# Patient Record
Sex: MALE | Race: WHITE | Employment: OTHER | URBAN - METROPOLITAN AREA
[De-identification: names, ages, dates, MRNs, and addresses within clinical notes are randomized per-mention and may not be internally consistent; named-entity substitution may affect disease eponyms.]

---

## 2020-02-07 ENCOUNTER — HOSPITAL ENCOUNTER (EMERGENCY)
Age: 75
Discharge: HOME | End: 2020-02-07
Payer: COMMERCIAL

## 2020-02-07 VITALS
SYSTOLIC BLOOD PRESSURE: 128 MMHG | TEMPERATURE: 98.4 F | HEIGHT: 67 IN | OXYGEN SATURATION: 99 % | BODY MASS INDEX: 27.09 KG/M2 | HEART RATE: 64 BPM | WEIGHT: 172.62 LBS | DIASTOLIC BLOOD PRESSURE: 82 MMHG | RESPIRATION RATE: 14 BRPM

## 2020-02-07 LAB
RAPID INFLUENZA  B AGN: NEGATIVE
RAPID INFLUENZA A AGN: NEGATIVE

## 2020-02-07 PROCEDURE — 99283 EMERGENCY DEPT VISIT LOW MDM: CPT

## 2020-02-07 PROCEDURE — 87804 INFLUENZA ASSAY W/OPTIC: CPT

## 2020-02-07 RX ORDER — LORATADINE 10 MG/1
10 TABLET ORAL DAILY
Qty: 30 TABLET | Refills: 0 | Status: ON HOLD | OUTPATIENT
Start: 2020-02-07 | End: 2022-08-04

## 2020-02-07 RX ORDER — GUAIFENESIN, PSEUDOEPHEDRINE HYDROCHLORIDE 600; 60 MG/1; MG/1
1 TABLET, EXTENDED RELEASE ORAL EVERY 12 HOURS
Qty: 14 TABLET | Refills: 0 | Status: SHIPPED | OUTPATIENT
Start: 2020-02-07 | End: 2020-02-07 | Stop reason: ALTCHOICE

## 2020-02-07 RX ORDER — ACETAMINOPHEN 325 MG/1
650 TABLET ORAL EVERY 6 HOURS PRN
Qty: 60 TABLET | Refills: 0 | Status: SHIPPED | OUTPATIENT
Start: 2020-02-07

## 2020-02-07 RX ORDER — ALBUTEROL SULFATE 90 UG/1
AEROSOL, METERED RESPIRATORY (INHALATION)
Qty: 1 INHALER | Refills: 0 | Status: SHIPPED | OUTPATIENT
Start: 2020-02-07

## 2020-02-07 RX ORDER — BENZONATATE 100 MG/1
100 CAPSULE ORAL 3 TIMES DAILY PRN
Qty: 20 CAPSULE | Refills: 0 | Status: ON HOLD | OUTPATIENT
Start: 2020-02-07 | End: 2022-08-04

## 2020-02-07 SDOH — HEALTH STABILITY: MENTAL HEALTH: HOW OFTEN DO YOU HAVE A DRINK CONTAINING ALCOHOL?: NEVER

## 2020-02-07 ASSESSMENT — PAIN SCALES - GENERAL: PAINLEVEL_OUTOF10: 6

## 2020-02-07 ASSESSMENT — PAIN DESCRIPTION - ORIENTATION: ORIENTATION: RIGHT;LEFT

## 2020-02-07 ASSESSMENT — PAIN DESCRIPTION - FREQUENCY: FREQUENCY: CONTINUOUS

## 2020-02-07 ASSESSMENT — PAIN DESCRIPTION - PROGRESSION: CLINICAL_PROGRESSION: GRADUALLY WORSENING

## 2020-02-07 ASSESSMENT — PAIN DESCRIPTION - ONSET: ONSET: GRADUAL

## 2020-02-07 ASSESSMENT — PAIN DESCRIPTION - LOCATION: LOCATION: EYE

## 2020-02-07 ASSESSMENT — PAIN DESCRIPTION - DESCRIPTORS: DESCRIPTORS: BURNING

## 2020-02-07 ASSESSMENT — PAIN DESCRIPTION - PAIN TYPE: TYPE: ACUTE PAIN

## 2020-02-08 NOTE — ED PROVIDER NOTES
Smokeless tobacco: Never Used   Substance and Sexual Activity    Alcohol use: Never     Frequency: Never    Drug use: Never    Sexual activity: None   Lifestyle    Physical activity:     Days per week: None     Minutes per session: None    Stress: None   Relationships    Social connections:     Talks on phone: None     Gets together: None     Attends Anglican service: None     Active member of club or organization: None     Attends meetings of clubs or organizations: None     Relationship status: None    Intimate partner violence:     Fear of current or ex partner: None     Emotionally abused: None     Physically abused: None     Forced sexual activity: None   Other Topics Concern    None   Social History Narrative    None       PHYSICAL EXAM    VITAL SIGNS: /82   Pulse 64   Temp 98.4 °F (36.9 °C) (Oral)   Resp 14   Ht 5' 7\" (1.702 m)   Wt 172 lb 9.9 oz (78.3 kg)   SpO2 99%   BMI 27.04 kg/m²   Constitutional:  Well developed, well nourished, no acute distress  Eyes: Sclera nonicteric, conjunctiva normal   Ears: The right ear canal appears clear and the ipsilateral TM appears clear, the ipsilateral mastoid and pinna are without redness or swelling. The left ear canal appears clear and the ipsilateral TM appears clear, the ipsilateral mastoid and pinna are without redness or swelling    Throat/Face:  no exudate or edema of the throat, no trismus  Neck: Supple, no enlarged tonsillar lymphadenopathy  Respiratory:  Lungs clear to auscultation bilaterally, no retractions  Cardiovascular:  regular rate, regular rhythm  GI: soft, nontender, nondistended  Neurologic: Awake, alert and oriented, no slurred speech  Integument: Skin is warm and dry, no rash on exposed skin. Healing sore on right external nare, no surrounding cellulitis or evidence of infection. RADIOLOGY/PROCEDURES    No orders to display       ED [facility]    See chart for details of medications given.     Vitals:

## 2022-08-03 ENCOUNTER — APPOINTMENT (OUTPATIENT)
Dept: CT IMAGING | Age: 77
DRG: 871 | End: 2022-08-03
Payer: COMMERCIAL

## 2022-08-03 ENCOUNTER — APPOINTMENT (OUTPATIENT)
Dept: GENERAL RADIOLOGY | Age: 77
DRG: 871 | End: 2022-08-03
Payer: COMMERCIAL

## 2022-08-03 ENCOUNTER — HOSPITAL ENCOUNTER (INPATIENT)
Age: 77
LOS: 7 days | Discharge: NURSING, REHAB OR LONG-TERM CARE | DRG: 871 | End: 2022-08-10
Attending: EMERGENCY MEDICINE | Admitting: INTERNAL MEDICINE
Payer: COMMERCIAL

## 2022-08-03 DIAGNOSIS — R09.02 HYPOXIA: ICD-10-CM

## 2022-08-03 DIAGNOSIS — R65.20 SEVERE SEPSIS (HCC): ICD-10-CM

## 2022-08-03 DIAGNOSIS — N28.9 RENAL INSUFFICIENCY: ICD-10-CM

## 2022-08-03 DIAGNOSIS — J18.9 PNEUMONIA DUE TO INFECTIOUS ORGANISM, UNSPECIFIED LATERALITY, UNSPECIFIED PART OF LUNG: Primary | ICD-10-CM

## 2022-08-03 DIAGNOSIS — A41.9 SEVERE SEPSIS (HCC): ICD-10-CM

## 2022-08-03 LAB
A/G RATIO: 0.9 (ref 1.1–2.2)
ABO/RH: NORMAL
ALBUMIN SERPL-MCNC: 3.5 G/DL (ref 3.4–5)
ALP BLD-CCNC: 76 U/L (ref 40–129)
ALT SERPL-CCNC: 41 U/L (ref 10–40)
AMMONIA: 24 UMOL/L (ref 16–60)
AMORPHOUS: ABNORMAL /HPF
AMPHETAMINE SCREEN, URINE: NORMAL
ANION GAP SERPL CALCULATED.3IONS-SCNC: 17 MMOL/L (ref 3–16)
ANTIBODY SCREEN: NORMAL
APTT: 49 SEC (ref 23–34.3)
AST SERPL-CCNC: 243 U/L (ref 15–37)
BACTERIA: ABNORMAL /HPF
BARBITURATE SCREEN URINE: NORMAL
BASE EXCESS VENOUS: -0.2 MMOL/L
BASE EXCESS VENOUS: 0.7 MMOL/L (ref -3–3)
BASOPHILS ABSOLUTE: 0.1 K/UL (ref 0–0.2)
BASOPHILS RELATIVE PERCENT: 0.8 %
BENZODIAZEPINE SCREEN, URINE: NORMAL
BILIRUB SERPL-MCNC: 0.6 MG/DL (ref 0–1)
BILIRUBIN URINE: NEGATIVE
BLOOD, URINE: ABNORMAL
BUN BLDV-MCNC: 28 MG/DL (ref 7–20)
CALCIUM SERPL-MCNC: 8.3 MG/DL (ref 8.3–10.6)
CANNABINOID SCREEN URINE: NORMAL
CARBOXYHEMOGLOBIN: 1.1 %
CHLORIDE BLD-SCNC: 97 MMOL/L (ref 99–110)
CLARITY: CLEAR
CO2: 20 MMOL/L (ref 21–32)
COARSE CASTS, UA: ABNORMAL /LPF (ref 0–2)
COCAINE METABOLITE SCREEN URINE: NORMAL
COLOR: ABNORMAL
COMMENT UA: ABNORMAL
CREAT SERPL-MCNC: 1.4 MG/DL (ref 0.8–1.3)
EOSINOPHILS ABSOLUTE: 0 K/UL (ref 0–0.6)
EOSINOPHILS RELATIVE PERCENT: 0 %
EPITHELIAL CELLS, UA: ABNORMAL /HPF (ref 0–5)
GFR AFRICAN AMERICAN: 60
GFR NON-AFRICAN AMERICAN: 49
GLUCOSE BLD-MCNC: 104 MG/DL (ref 70–99)
GLUCOSE BLD-MCNC: 105 MG/DL (ref 70–99)
GLUCOSE URINE: NEGATIVE MG/DL
HCO3 VENOUS: 24.3 MMOL/L (ref 23–29)
HCO3 VENOUS: 25 MMOL/L (ref 23–29)
HCT VFR BLD CALC: 31.8 % (ref 40.5–52.5)
HCT VFR BLD CALC: 35 % (ref 40.5–52.5)
HEMOGLOBIN: 10.1 G/DL (ref 13.5–17.5)
HEMOGLOBIN: 11.3 G/DL (ref 13.5–17.5)
HYALINE CASTS: ABNORMAL /LPF (ref 0–2)
INR BLD: 1.35 (ref 0.87–1.14)
KETONES, URINE: NEGATIVE MG/DL
LACTIC ACID: 1.4 MMOL/L (ref 0.4–2)
LACTIC ACID: 1.9 MMOL/L (ref 0.4–2)
LACTIC ACID: 2.3 MMOL/L (ref 0.4–2)
LEUKOCYTE ESTERASE, URINE: NEGATIVE
LYMPHOCYTES ABSOLUTE: 1.1 K/UL (ref 1–5.1)
LYMPHOCYTES RELATIVE PERCENT: 10.1 %
Lab: NORMAL
MCH RBC QN AUTO: 23.3 PG (ref 26–34)
MCHC RBC AUTO-ENTMCNC: 32.3 G/DL (ref 31–36)
MCV RBC AUTO: 72.2 FL (ref 80–100)
METHADONE SCREEN, URINE: NORMAL
METHEMOGLOBIN VENOUS: 0.3 %
MICROSCOPIC EXAMINATION: YES
MONOCYTES ABSOLUTE: 1 K/UL (ref 0–1.3)
MONOCYTES RELATIVE PERCENT: 9 %
MUCUS: ABNORMAL /LPF
NEUTROPHILS ABSOLUTE: 8.5 K/UL (ref 1.7–7.7)
NEUTROPHILS RELATIVE PERCENT: 80.1 %
NITRITE, URINE: NEGATIVE
O2 CONTENT, VEN: 34 VOL %
O2 SAT, VEN: 36 %
O2 SAT, VEN: 71 %
O2 THERAPY: ABNORMAL
O2 THERAPY: NORMAL
OPIATE SCREEN URINE: NORMAL
OXYCODONE URINE: NORMAL
PCO2, VEN: 33 MMHG (ref 40–50)
PCO2, VEN: 41.7 MMHG (ref 40–50)
PDW BLD-RTO: 20.5 % (ref 12.4–15.4)
PERFORMED ON: ABNORMAL
PH UA: 6
PH UA: 6 (ref 5–8)
PH VENOUS: 7.38 (ref 7.35–7.45)
PH VENOUS: 7.47 (ref 7.35–7.45)
PHENCYCLIDINE SCREEN URINE: NORMAL
PLATELET # BLD: 203 K/UL (ref 135–450)
PMV BLD AUTO: 8.2 FL (ref 5–10.5)
PO2, VEN: 34.3 MMHG (ref 25–40)
PO2, VEN: <30 MMHG
POTASSIUM SERPL-SCNC: 4.8 MMOL/L (ref 3.5–5.1)
PRO-BNP: 594 PG/ML (ref 0–449)
PROPOXYPHENE SCREEN: NORMAL
PROTEIN UA: 100 MG/DL
PROTHROMBIN TIME: 16.6 SEC (ref 11.7–14.5)
RBC # BLD: 4.86 M/UL (ref 4.2–5.9)
RBC UA: ABNORMAL /HPF (ref 0–4)
SARS-COV-2, NAAT: NOT DETECTED
SODIUM BLD-SCNC: 134 MMOL/L (ref 136–145)
SPECIFIC GRAVITY UA: 1.02 (ref 1–1.03)
TCO2 CALC VENOUS: 25 MMOL/L
TCO2 CALC VENOUS: 26 MMOL/L
TOTAL CK: 5440 U/L (ref 39–308)
TOTAL PROTEIN: 7.5 G/DL (ref 6.4–8.2)
TROPONIN: 0.05 NG/ML
URINE REFLEX TO CULTURE: ABNORMAL
URINE TYPE: ABNORMAL
UROBILINOGEN, URINE: 1 E.U./DL
WBC # BLD: 10.6 K/UL (ref 4–11)
WBC UA: ABNORMAL /HPF (ref 0–5)

## 2022-08-03 PROCEDURE — 85610 PROTHROMBIN TIME: CPT

## 2022-08-03 PROCEDURE — 6370000000 HC RX 637 (ALT 250 FOR IP): Performed by: EMERGENCY MEDICINE

## 2022-08-03 PROCEDURE — 36415 COLL VENOUS BLD VENIPUNCTURE: CPT

## 2022-08-03 PROCEDURE — 85730 THROMBOPLASTIN TIME PARTIAL: CPT

## 2022-08-03 PROCEDURE — 71250 CT THORAX DX C-: CPT

## 2022-08-03 PROCEDURE — 6360000002 HC RX W HCPCS: Performed by: EMERGENCY MEDICINE

## 2022-08-03 PROCEDURE — 86850 RBC ANTIBODY SCREEN: CPT

## 2022-08-03 PROCEDURE — 80053 COMPREHEN METABOLIC PANEL: CPT

## 2022-08-03 PROCEDURE — 96365 THER/PROPH/DIAG IV INF INIT: CPT

## 2022-08-03 PROCEDURE — 83605 ASSAY OF LACTIC ACID: CPT

## 2022-08-03 PROCEDURE — 70450 CT HEAD/BRAIN W/O DYE: CPT

## 2022-08-03 PROCEDURE — 96366 THER/PROPH/DIAG IV INF ADDON: CPT

## 2022-08-03 PROCEDURE — 80307 DRUG TEST PRSMV CHEM ANLYZR: CPT

## 2022-08-03 PROCEDURE — 82550 ASSAY OF CK (CPK): CPT

## 2022-08-03 PROCEDURE — 87635 SARS-COV-2 COVID-19 AMP PRB: CPT

## 2022-08-03 PROCEDURE — 83880 ASSAY OF NATRIURETIC PEPTIDE: CPT

## 2022-08-03 PROCEDURE — 99285 EMERGENCY DEPT VISIT HI MDM: CPT

## 2022-08-03 PROCEDURE — 2580000003 HC RX 258: Performed by: EMERGENCY MEDICINE

## 2022-08-03 PROCEDURE — 85014 HEMATOCRIT: CPT

## 2022-08-03 PROCEDURE — 86901 BLOOD TYPING SEROLOGIC RH(D): CPT

## 2022-08-03 PROCEDURE — 84484 ASSAY OF TROPONIN QUANT: CPT

## 2022-08-03 PROCEDURE — 82803 BLOOD GASES ANY COMBINATION: CPT

## 2022-08-03 PROCEDURE — 96367 TX/PROPH/DG ADDL SEQ IV INF: CPT

## 2022-08-03 PROCEDURE — 73502 X-RAY EXAM HIP UNI 2-3 VIEWS: CPT

## 2022-08-03 PROCEDURE — 86900 BLOOD TYPING SEROLOGIC ABO: CPT

## 2022-08-03 PROCEDURE — 85018 HEMOGLOBIN: CPT

## 2022-08-03 PROCEDURE — 71045 X-RAY EXAM CHEST 1 VIEW: CPT

## 2022-08-03 PROCEDURE — 82140 ASSAY OF AMMONIA: CPT

## 2022-08-03 PROCEDURE — 85025 COMPLETE CBC W/AUTO DIFF WBC: CPT

## 2022-08-03 PROCEDURE — 94640 AIRWAY INHALATION TREATMENT: CPT

## 2022-08-03 PROCEDURE — 87040 BLOOD CULTURE FOR BACTERIA: CPT

## 2022-08-03 PROCEDURE — 1200000000 HC SEMI PRIVATE

## 2022-08-03 PROCEDURE — 81001 URINALYSIS AUTO W/SCOPE: CPT

## 2022-08-03 RX ORDER — FUROSEMIDE 20 MG/1
20 TABLET ORAL DAILY
Status: ON HOLD | COMMUNITY
Start: 2022-06-10 | End: 2022-08-09 | Stop reason: HOSPADM

## 2022-08-03 RX ORDER — SODIUM CHLORIDE 0.9 % (FLUSH) 0.9 %
5-40 SYRINGE (ML) INJECTION PRN
Status: DISCONTINUED | OUTPATIENT
Start: 2022-08-03 | End: 2022-08-04 | Stop reason: SDUPTHER

## 2022-08-03 RX ORDER — SODIUM CHLORIDE 9 MG/ML
INJECTION, SOLUTION INTRAVENOUS PRN
Status: DISCONTINUED | OUTPATIENT
Start: 2022-08-03 | End: 2022-08-04 | Stop reason: SDUPTHER

## 2022-08-03 RX ORDER — ACETAMINOPHEN 325 MG/1
650 TABLET ORAL ONCE
Status: COMPLETED | OUTPATIENT
Start: 2022-08-03 | End: 2022-08-03

## 2022-08-03 RX ORDER — LEVOFLOXACIN 5 MG/ML
750 INJECTION, SOLUTION INTRAVENOUS ONCE
Status: COMPLETED | OUTPATIENT
Start: 2022-08-03 | End: 2022-08-03

## 2022-08-03 RX ORDER — 0.9 % SODIUM CHLORIDE 0.9 %
1000 INTRAVENOUS SOLUTION INTRAVENOUS ONCE
Status: COMPLETED | OUTPATIENT
Start: 2022-08-03 | End: 2022-08-03

## 2022-08-03 RX ORDER — IPRATROPIUM BROMIDE AND ALBUTEROL SULFATE 2.5; .5 MG/3ML; MG/3ML
1 SOLUTION RESPIRATORY (INHALATION)
Status: DISCONTINUED | OUTPATIENT
Start: 2022-08-03 | End: 2022-08-04

## 2022-08-03 RX ORDER — 0.9 % SODIUM CHLORIDE 0.9 %
30 INTRAVENOUS SOLUTION INTRAVENOUS ONCE
Status: COMPLETED | OUTPATIENT
Start: 2022-08-03 | End: 2022-08-03

## 2022-08-03 RX ORDER — SODIUM CHLORIDE 0.9 % (FLUSH) 0.9 %
5-40 SYRINGE (ML) INJECTION EVERY 12 HOURS SCHEDULED
Status: DISCONTINUED | OUTPATIENT
Start: 2022-08-03 | End: 2022-08-04 | Stop reason: SDUPTHER

## 2022-08-03 RX ADMIN — SODIUM CHLORIDE 1000 ML: 9 INJECTION, SOLUTION INTRAVENOUS at 13:48

## 2022-08-03 RX ADMIN — PIPERACILLIN AND TAZOBACTAM 4500 MG: 4; .5 INJECTION, POWDER, LYOPHILIZED, FOR SOLUTION INTRAVENOUS at 15:12

## 2022-08-03 RX ADMIN — IPRATROPIUM BROMIDE AND ALBUTEROL SULFATE 1 AMPULE: .5; 3 SOLUTION RESPIRATORY (INHALATION) at 10:35

## 2022-08-03 RX ADMIN — ACETAMINOPHEN 650 MG: 325 TABLET ORAL at 14:45

## 2022-08-03 RX ADMIN — SODIUM CHLORIDE 1061 ML: 9 INJECTION, SOLUTION INTRAVENOUS at 15:13

## 2022-08-03 RX ADMIN — IPRATROPIUM BROMIDE AND ALBUTEROL SULFATE 1 AMPULE: .5; 3 SOLUTION RESPIRATORY (INHALATION) at 20:33

## 2022-08-03 RX ADMIN — LEVOFLOXACIN 750 MG: 5 INJECTION, SOLUTION INTRAVENOUS at 16:38

## 2022-08-03 RX ADMIN — SODIUM CHLORIDE, PRESERVATIVE FREE 10 ML: 5 INJECTION INTRAVENOUS at 23:56

## 2022-08-03 ASSESSMENT — LIFESTYLE VARIABLES
HOW MANY STANDARD DRINKS CONTAINING ALCOHOL DO YOU HAVE ON A TYPICAL DAY: 1 OR 2
HOW OFTEN DO YOU HAVE A DRINK CONTAINING ALCOHOL: 2-4 TIMES A MONTH

## 2022-08-03 ASSESSMENT — PAIN SCALES - GENERAL: PAINLEVEL_OUTOF10: 0

## 2022-08-03 ASSESSMENT — ENCOUNTER SYMPTOMS
SORE THROAT: 0
EYE REDNESS: 0
ABDOMINAL PAIN: 0
SHORTNESS OF BREATH: 0
RHINORRHEA: 0

## 2022-08-03 ASSESSMENT — PAIN DESCRIPTION - FREQUENCY: FREQUENCY: CONTINUOUS

## 2022-08-03 ASSESSMENT — PAIN - FUNCTIONAL ASSESSMENT
PAIN_FUNCTIONAL_ASSESSMENT: NONE - DENIES PAIN
PAIN_FUNCTIONAL_ASSESSMENT: 0-10

## 2022-08-03 NOTE — PROGRESS NOTES
Patient arrived to PCU via stretcher w/ EMS transport. He is oriented to self and month, but disoriented to place and situation. He cannot tell me where he resides. He is lethargic with impaired hearing, but does easily arouse to voice. Per EMS staff, patient's spouse will be obtaining transport to [facility] this date.

## 2022-08-03 NOTE — ED NOTES
Report given to isiah ZUNIGA ,  Reports given to EMS,  Belongings sent with pts  Pts wife Is going to catch a cab over to [facility], [facility]  08/03/22 8878

## 2022-08-03 NOTE — ED NOTES
Pt given water and place on 3 L NC ,while asleep pt sat is 86-88%     Keaton Buenrostro RN  08/03/22 6835

## 2022-08-03 NOTE — ED NOTES
Pt on his third cup of water , pt wife at bedside, said he started to feel bad a few days ago, states that he started to lose his hearing last week. She states that they are sleeping outside, she said she thinks this might be from bad coffee, ot he smoked something, RN explained multiple times pt has a cough he stated that he is fatigued , pt said oh he is probable just stressed .       Marie Reyes, RN  08/03/22 Dk Mckeon, NADIA  08/03/22 0568

## 2022-08-03 NOTE — ED PROVIDER NOTES
[address] [facility]  eMERGENCY dEPARTMENT eNCOUnter      Pt Name: Mavis Mcpherson  MRN: [id number]  [place] 1945  Date of evaluation: 8/3/2022  Provider: Cheyenne Xavier MD    [address] [place] [address]       Chief Complaint   Patient presents with    Shortness of Breath     Pt was picked up outside of the [place] [address] , EMS reports pts confused not really answering, pt looks bad ,          HISTORY OF PRESENT ILLNESS   (Location/Symptom, Timing/Onset,Context/Setting, Quality, Duration, Modifying Factors, Severity)  Note limiting factors. Mavis Mcpherson is a 77 y.o. male who presents to the emergency department via EMS. EMS reports that they picked him up because he was not feeling well. No interventions were performed prior to arrival in our ED. Patient states he is very hard of hearing in his right ear. He states that he is just tired and was unable to stand up today and someone called the ambulance. He denies fever, chills, shortness of breath, chest pain, belly pain, urinary symptoms, any other symptoms other than just fatigue. He reports being homeless. HPI    NursingNotes were reviewed. REVIEW OF SYSTEMS    (2-9 systems for level 4, 10 or more for level 5)     Review of Systems   Constitutional:  Positive for fatigue. Negative for fever. HENT:  Negative for rhinorrhea and sore throat. Eyes:  Negative for redness. Respiratory:  Negative for shortness of breath. Cardiovascular:  Negative for chest pain. Gastrointestinal:  Negative for abdominal pain. Genitourinary:  Negative for dysuria, flank pain and frequency. Musculoskeletal:  Negative for myalgias. Skin:  Negative for rash. Neurological:  Negative for headaches. Hematological:  Negative for adenopathy. Psychiatric/Behavioral:  Negative for confusion. Except as noted above the remainder of the review of systems was reviewed and negative.        PAST MEDICAL HISTORY   No past medical history on file.      SURGICALHISTORY     No past surgical history on file. CURRENT MEDICATIONS       Previous Medications    ACETAMINOPHEN (AMINOFEN) 325 MG TABLET    Take 2 tablets by mouth every 6 hours as needed for Pain    ALBUTEROL SULFATE HFA (PROAIR HFA) 108 (90 BASE) MCG/ACT INHALER    Inhale 1-2 puffs by mouth every 4 hours while awake for 7-10 days then PRN wheezing  Dispense with SPACER and Instruct on use. May sub Ventolin or Proventil as needed per [employer]. BENZONATATE (TESSALON PERLES) 100 MG CAPSULE    Take 1 capsule by mouth 3 times daily as needed for Cough    LORATADINE (CLARITIN) 10 MG TABLET    Take 1 tablet by mouth daily       ALLERGIES     Patient has no known allergies. FAMILY HISTORY     No family history on file. SOCIAL HISTORY       Social History     Socioeconomic History    Marital status:    Tobacco Use    Smoking status: Every Day     Packs/day: 1.00     Types: Cigarettes    Smokeless tobacco: Never   Substance and Sexual Activity    Alcohol use: Never    Drug use: Never       SCREENINGS    [place] Coma Scale  Eye Opening: Spontaneous  Best Verbal Response: Oriented  Best Motor Response: Obeys commands  [place] Coma Scale Score: 15        PHYSICAL EXAM    (up to 7 for level 4, 8 or more for level 5)     ED Triage Vitals   BP Temp Temp src Pulse Resp SpO2 Height Weight   -- -- -- -- -- -- -- --       Physical Exam  Vitals and nursing note reviewed. Constitutional:       Appearance: He is well-developed. He is not diaphoretic. HENT:      Head: Normocephalic and atraumatic. Nose:      Comments: Patient has a little bit smaller than a dime size area involving his right lateral nare with scabbing vs mass     Mouth/Throat:      Mouth: Mucous membranes are dry. Eyes:      General:         Right eye: No discharge. Left eye: No discharge. Conjunctiva/sclera: Conjunctivae normal.   Cardiovascular:      Rate and Rhythm: Tachycardia present.    Pulmonary: Effort: Pulmonary effort is normal. No respiratory distress. Comments: Patient is tachypneic. There is some mild accessory muscle use. There is wheezing left side worse than right side. Breath sounds are present bilaterally. Abdominal:      Palpations: Abdomen is soft. Tenderness: There is no abdominal tenderness. There is no guarding or rebound. Musculoskeletal:         General: Normal range of motion. Cervical back: Neck supple. Skin:     General: Skin is warm and dry. Capillary Refill: Capillary refill takes less than 2 seconds. Neurological:      Mental Status: He is alert and oriented to person, place, and time. Psychiatric:         Behavior: Behavior normal.       DIAGNOSTIC RESULTS     EKG: All EKG's are interpreted by the Emergency Department Physician who either signs or Co-signsthis chart in the absence of a cardiologist.        RADIOLOGY:   Janiya Oxford such as CT, Ultrasound and MRI are read by the radiologist. Plain radiographic images are visualized and preliminarily interpreted by the emergency physician with the below findings:        Interpretation per the Radiologist below, if available at the time ofthis note:    CT HEAD WO CONTRAST   Preliminary Result   No evidence of acute intracranial abnormality. CT CHEST WO CONTRAST   Preliminary Result   1. Nonspecific curvilinear consolidative opacity within the paramediastinal   aspect of the left upper lobe, demonstrating internal bronchograms, which   could represent either pneumonia or parenchymal scar. Suggest appropriate   clinical treatment, and short-term chest CT follow-up in 6-8 weeks to ensure   resolution of this consolidative opacity. 2. Additional subpleural curvilinear and ground-glass opacity within the   anterior left upper lobe likely reflects either additional atelectasis,   pneumonia, or parenchymal scarring. 3. Moderate emphysema.    4. Multiple scattered subcentimeter noncalcified nodules throughout both   lungs, the largest measuring 5 mm within the subpleural right lower lobe. Most likely benign granulomatous disease, further follow-up of these nodules   is as advised below. Namely, given patient's high risk status, recommend   chest CT follow-up in 12 months. 5. Mild mediastinal lymphadenopathy is most likely benign and reactive in   etiology given the patient's lung findings. However, this should also be   followed at the time of airspace opacity and pulmonary nodule follow-up to   ensure its stability or resolution as well. RECOMMENDATIONS:   Fleischner Society guidelines for follow-up and management of incidentally   detected pulmonary nodules:      Multiple Solid Nodules:      Nodule size less than 6 mm   In a low-risk patient, no routine follow-up. In a high-risk patient, optional CT at 12 months. Radiology 2017 http://pubs. rsna.org/doi/full/10.1148/radiol. 6731303709         XR HIP RIGHT (2-3 VIEWS)   Final Result   No acute osseous abnormality of the pelvis or right hip. XR CHEST PORTABLE   Final Result   No acute process.                ED BEDSIDE ULTRASOUND:   Performed by ED Physician - none    LABS:  Labs Reviewed   BLOOD GAS, VENOUS - Abnormal; Notable for the following components:       Result Value    pH, Nick 7.475 (*)     pCO2, Nick 33.0 (*)     All other components within normal limits   BRAIN NATRIURETIC PEPTIDE - Abnormal; Notable for the following components:    Pro- (*)     All other components within normal limits   CBC WITH AUTO DIFFERENTIAL - Abnormal; Notable for the following components:    Hemoglobin 11.3 (*)     Hematocrit 35.0 (*)     MCV 72.2 (*)     MCH 23.3 (*)     RDW 20.5 (*)     Neutrophils Absolute 8.5 (*)     All other components within normal limits   COMPREHENSIVE METABOLIC PANEL - Abnormal; Notable for the following components:    Sodium 134 (*)     Chloride 97 (*)     CO2 20 (*)     Anion Gap 17 (*)     Glucose 104 (*) BUN 28 (*)     Creatinine 1.4 (*)     GFR Non- 49 (*)     GFR African American 60 (*)     Albumin/Globulin Ratio 0.9 (*)     ALT 41 (*)      (*)     All other components within normal limits   LACTIC ACID - Abnormal; Notable for the following components:    Lactic Acid 2.3 (*)     All other components within normal limits   POCT GLUCOSE - Abnormal; Notable for the following components:    POC Glucose 105 (*)     All other components within normal limits   COVID-19, RAPID   CULTURE, BLOOD 1   CULTURE, BLOOD 2   LACTIC ACID   URINALYSIS WITH REFLEX TO CULTURE       All other labs were within normal range or not returned as of this dictation. EMERGENCY DEPARTMENT COURSE and DIFFERENTIAL DIAGNOSIS/MDM:   Vitals:    Vitals:    08/03/22 1343 08/03/22 1345 08/03/22 1349 08/03/22 1456   BP: 128/82 (!) 140/66     Pulse: 96 81 81    Resp: 25 23 24    Temp:   (!) 101.5 °F (38.6 °C)    TempSrc:   Oral    SpO2: 97% 97% 96%    Weight:    151 lb 7.3 oz (68.7 kg)           MDM  Number of Diagnoses or Management Options  Hypoxia  Pneumonia due to infectious organism, unspecified laterality, unspecified part of lung  Renal insufficiency  Severe sepsis (HCC)  Diagnosis management comments: DDX: Pneumonia, COVID, UTI, dehydration, anemia, other    This patient is very hard of hearing and it is best to speak into his right ear. He states he is just felt poorly today. When he initially presented he did not have a fever and his white count was 10.6 with a hemoglobin of 11.3 and hematocrit of 35. He had a left shift. His initial proBNP was mildly elevated at 594 but not far off based off age. His initial lactic acid was over 2 with a BUN of 28 and a creatinine of 1.4. While we were working up the patient he spiked a fever and was given Tylenol. His initial chest x-ray did not show an acute process. Due to lab issues we were unable to get a rapid COVID on this patient.   He also was noted to have some bruising of his right hip so an x-ray was performed which did not show any osseous abnormality of his pelvis right hip. CT of the brain did not show any evidence of acute intracranial abnormality. When the patient spiked fever there was concern for pneumonia that was not showing up on the chest x-ray and given his kidney function I did not feel that a contrasted study would be appropriate. CT scan shows some changes suggestive of pneumonia. He also has some changes of emphysema. As such we started the patient on antibiotics for severe sepsis. Time of sepsis identification was 8:96 PM.  He will be given 30 mils per kilo total of IV fluids. He was started on antibiotics for pulmonary sepsis with concern for possible Pseudomonas given his possible COPD that is 1 undiagnosed. Urinalysis has not been given because the patient has urinated on himself x2 in the ED. He will be admitted to internal medicine for further work-up and evaluation. Is this patient to be included in the SEP-1 Core Measure? Yes   SEP-1 CORE MEASURE DATA      Sepsis Criteria   Severe Sepsis Criteria   Septic Shock Criteria     Must be confirmed or suspected to move forward with diagnosis of sepsis. Must meet 2:    [x] Temperature > 100.9 F (38.3 C)        or < 96.8 F (36 C)  [] HR > 90  [x] RR > 20  [] WBC > 12 or < 4 or 10% bands      AND:      [x] Infection Confirmed or        Suspected. Must meet 1:    [x] Lactate > 2       or   [] Signs of Organ Dysfunction:    - SBP < 90 or MAP < 65  - Altered mental status  - Creatinine > 2 or increased from      baseline  - Urine Output < 0.5 ml/kg/hr  - Bilirubin > 2  - INR > 1.5 (not anticoagulated)  - Platelets < 924,308  - Acute Respiratory Failure as     evidenced by new need for NIPPV     or mechanical ventilation      [] No criteria met for Severe Sepsis.    Must meet 1:    [] Lactate > 4        or   [] SBP < 90 or MAP < 65 for at        least two readings in the first        hour after fluid bolus        administration      [] Vasopressors initiated (if hypotension persists after fluid resuscitation)        [] No criteria met for Septic Shock. Patient Vitals for the past 6 hrs:   BP Temp Pulse Resp SpO2 Weight Weight Method Percent Weight Change   08/03/22 1050 (!) 152/89 98.3 °F (36.8 °C) (!) 105 22 92 % -- -- --   08/03/22 1217 -- -- 85 19 91 % -- -- --   08/03/22 1232 -- -- 83 18 97 % -- -- --   08/03/22 1247 -- -- 82 18 97 % -- -- --   08/03/22 1257 (!) 141/78 -- 91 17 96 % -- -- --   08/03/22 1300 138/73 -- 88 18 97 % -- -- --   08/03/22 1315 131/61 -- 82 21 98 % -- -- --   08/03/22 1343 128/82 -- 96 25 97 % -- -- --   08/03/22 1345 (!) 140/66 -- 81 23 97 % -- -- --   08/03/22 1349 -- (!) 101.5 °F (38.6 °C) 81 24 96 % -- -- --   08/03/22 1456 -- -- -- -- -- 151 lb 7.3 oz (68.7 kg) Bedside scale 0      Recent Labs     08/03/22  1035 08/03/22  1100 08/03/22  1259   WBC  --  10.6  --    LACTA 2.3*  --  1.9   CREATININE 1.4*  --   --    BILITOT 0.6  --   --    PLT  --  203  --          Time Severe Sepsis Identified: 1440    Fluid Resuscitation Rational: at least 30mL/kg based on entered actual weight at time of triage    Repeat lactate level: improving    Reassessment Exam:   Not applicable. Patient does not have septic shock. CRITICAL CARE TIME   Total Critical Care time was 30 minutes, excluding separately reportable procedures. There was a high probability of clinically significant/life threatening deterioration in the patient's condition which required my urgent intervention. CONSULTS:  None    PROCEDURES:  Unless otherwise noted below, none     Procedures    FINAL IMPRESSION      1. Pneumonia due to infectious organism, unspecified laterality, unspecified part of lung    2. Hypoxia    3. Renal insufficiency    4.  Severe sepsis [facility])          DISPOSITION/PLAN   DISPOSITION Decision To Admit 08/03/2022 02:59:27 PM      PATIENT REFERRED TO:  No follow-up provider specified.     DISCHARGE MEDICATIONS:  New Prescriptions    No medications on file          (Please note that portions of this note were completed with a voice recognition program.Efforts were made to edit the dictations but occasionally words are mis-transcribed.)    Mary Arriola MD (electronically signed)  Attending Emergency Physician          Mary Arriola MD  08/03/22 4498

## 2022-08-04 LAB
ESTIMATED AVERAGE GLUCOSE: 116.9 MG/DL
FERRITIN: 233.3 NG/ML (ref 30–400)
GLUCOSE BLD-MCNC: 87 MG/DL (ref 70–99)
HBA1C MFR BLD: 5.7 %
HCT VFR BLD CALC: 28.9 % (ref 40.5–52.5)
HCT VFR BLD CALC: 31.2 % (ref 40.5–52.5)
HEMOGLOBIN: 9.2 G/DL (ref 13.5–17.5)
HEMOGLOBIN: 9.9 G/DL (ref 13.5–17.5)
IRON SATURATION: 13 % (ref 20–50)
IRON: 23 UG/DL (ref 59–158)
LV EF: 63 %
LVEF MODALITY: NORMAL
PERFORMED ON: NORMAL
TOTAL IRON BINDING CAPACITY: 182 UG/DL (ref 260–445)
TROPONIN: 0.03 NG/ML
TROPONIN: 0.04 NG/ML
TROPONIN: 0.04 NG/ML
TSH REFLEX FT4: 3.1 UIU/ML (ref 0.27–4.2)

## 2022-08-04 PROCEDURE — 83550 IRON BINDING TEST: CPT

## 2022-08-04 PROCEDURE — 84443 ASSAY THYROID STIM HORMONE: CPT

## 2022-08-04 PROCEDURE — 85018 HEMOGLOBIN: CPT

## 2022-08-04 PROCEDURE — 2580000003 HC RX 258: Performed by: INTERNAL MEDICINE

## 2022-08-04 PROCEDURE — 85014 HEMATOCRIT: CPT

## 2022-08-04 PROCEDURE — 82728 ASSAY OF FERRITIN: CPT

## 2022-08-04 PROCEDURE — 1200000000 HC SEMI PRIVATE

## 2022-08-04 PROCEDURE — 6360000002 HC RX W HCPCS: Performed by: INTERNAL MEDICINE

## 2022-08-04 PROCEDURE — 6360000004 HC RX CONTRAST MEDICATION: Performed by: INTERNAL MEDICINE

## 2022-08-04 PROCEDURE — A4216 STERILE WATER/SALINE, 10 ML: HCPCS | Performed by: INTERNAL MEDICINE

## 2022-08-04 PROCEDURE — 6370000000 HC RX 637 (ALT 250 FOR IP): Performed by: INTERNAL MEDICINE

## 2022-08-04 PROCEDURE — 84484 ASSAY OF TROPONIN QUANT: CPT

## 2022-08-04 PROCEDURE — 87641 MR-STAPH DNA AMP PROBE: CPT

## 2022-08-04 PROCEDURE — 94760 N-INVAS EAR/PLS OXIMETRY 1: CPT

## 2022-08-04 PROCEDURE — C8929 TTE W OR WO FOL WCON,DOPPLER: HCPCS

## 2022-08-04 PROCEDURE — 83540 ASSAY OF IRON: CPT

## 2022-08-04 PROCEDURE — C9113 INJ PANTOPRAZOLE SODIUM, VIA: HCPCS | Performed by: INTERNAL MEDICINE

## 2022-08-04 PROCEDURE — 83036 HEMOGLOBIN GLYCOSYLATED A1C: CPT

## 2022-08-04 PROCEDURE — 2700000000 HC OXYGEN THERAPY PER DAY

## 2022-08-04 PROCEDURE — 36415 COLL VENOUS BLD VENIPUNCTURE: CPT

## 2022-08-04 PROCEDURE — 94640 AIRWAY INHALATION TREATMENT: CPT

## 2022-08-04 RX ORDER — FERROUS SULFATE TAB EC 324 MG (65 MG FE EQUIVALENT) 324 (65 FE) MG
324 TABLET DELAYED RESPONSE ORAL
Status: DISCONTINUED | OUTPATIENT
Start: 2022-08-04 | End: 2022-08-10 | Stop reason: HOSPADM

## 2022-08-04 RX ORDER — SODIUM CHLORIDE, SODIUM LACTATE, POTASSIUM CHLORIDE, CALCIUM CHLORIDE 600; 310; 30; 20 MG/100ML; MG/100ML; MG/100ML; MG/100ML
INJECTION, SOLUTION INTRAVENOUS CONTINUOUS
Status: DISCONTINUED | OUTPATIENT
Start: 2022-08-04 | End: 2022-08-04

## 2022-08-04 RX ORDER — LABETALOL HYDROCHLORIDE 5 MG/ML
10 INJECTION, SOLUTION INTRAVENOUS EVERY 4 HOURS PRN
Status: DISCONTINUED | OUTPATIENT
Start: 2022-08-04 | End: 2022-08-10 | Stop reason: HOSPADM

## 2022-08-04 RX ORDER — ACETAMINOPHEN 325 MG/1
650 TABLET ORAL EVERY 6 HOURS PRN
Status: DISCONTINUED | OUTPATIENT
Start: 2022-08-04 | End: 2022-08-10 | Stop reason: HOSPADM

## 2022-08-04 RX ORDER — ONDANSETRON 2 MG/ML
4 INJECTION INTRAMUSCULAR; INTRAVENOUS EVERY 6 HOURS PRN
Status: DISCONTINUED | OUTPATIENT
Start: 2022-08-04 | End: 2022-08-10 | Stop reason: HOSPADM

## 2022-08-04 RX ORDER — PROMETHAZINE HYDROCHLORIDE 25 MG/1
12.5 TABLET ORAL EVERY 6 HOURS PRN
Status: DISCONTINUED | OUTPATIENT
Start: 2022-08-04 | End: 2022-08-10 | Stop reason: HOSPADM

## 2022-08-04 RX ORDER — SODIUM CHLORIDE 9 MG/ML
INJECTION, SOLUTION INTRAVENOUS CONTINUOUS
Status: DISCONTINUED | OUTPATIENT
Start: 2022-08-04 | End: 2022-08-06

## 2022-08-04 RX ORDER — IPRATROPIUM BROMIDE AND ALBUTEROL SULFATE 2.5; .5 MG/3ML; MG/3ML
1 SOLUTION RESPIRATORY (INHALATION)
Status: DISCONTINUED | OUTPATIENT
Start: 2022-08-04 | End: 2022-08-10

## 2022-08-04 RX ORDER — MAGNESIUM SULFATE IN WATER 40 MG/ML
2000 INJECTION, SOLUTION INTRAVENOUS PRN
Status: DISCONTINUED | OUTPATIENT
Start: 2022-08-04 | End: 2022-08-10 | Stop reason: HOSPADM

## 2022-08-04 RX ORDER — ACETAMINOPHEN 650 MG/1
650 SUPPOSITORY RECTAL EVERY 6 HOURS PRN
Status: DISCONTINUED | OUTPATIENT
Start: 2022-08-04 | End: 2022-08-10 | Stop reason: HOSPADM

## 2022-08-04 RX ORDER — ALBUTEROL SULFATE 90 UG/1
2 AEROSOL, METERED RESPIRATORY (INHALATION) EVERY 6 HOURS PRN
Status: DISCONTINUED | OUTPATIENT
Start: 2022-08-04 | End: 2022-08-10 | Stop reason: HOSPADM

## 2022-08-04 RX ORDER — LINEZOLID 2 MG/ML
600 INJECTION, SOLUTION INTRAVENOUS EVERY 12 HOURS
Status: DISCONTINUED | OUTPATIENT
Start: 2022-08-04 | End: 2022-08-07

## 2022-08-04 RX ORDER — SODIUM CHLORIDE 0.9 % (FLUSH) 0.9 %
10 SYRINGE (ML) INJECTION PRN
Status: DISCONTINUED | OUTPATIENT
Start: 2022-08-04 | End: 2022-08-10 | Stop reason: HOSPADM

## 2022-08-04 RX ORDER — SODIUM CHLORIDE 9 MG/ML
INJECTION, SOLUTION INTRAVENOUS CONTINUOUS
Status: DISCONTINUED | OUTPATIENT
Start: 2022-08-04 | End: 2022-08-04

## 2022-08-04 RX ORDER — POTASSIUM CHLORIDE 7.45 MG/ML
10 INJECTION INTRAVENOUS PRN
Status: DISCONTINUED | OUTPATIENT
Start: 2022-08-04 | End: 2022-08-10 | Stop reason: HOSPADM

## 2022-08-04 RX ORDER — SODIUM CHLORIDE 0.9 % (FLUSH) 0.9 %
10 SYRINGE (ML) INJECTION EVERY 12 HOURS SCHEDULED
Status: DISCONTINUED | OUTPATIENT
Start: 2022-08-04 | End: 2022-08-10 | Stop reason: HOSPADM

## 2022-08-04 RX ORDER — SODIUM CHLORIDE 9 MG/ML
INJECTION, SOLUTION INTRAVENOUS PRN
Status: DISCONTINUED | OUTPATIENT
Start: 2022-08-04 | End: 2022-08-10

## 2022-08-04 RX ADMIN — SODIUM CHLORIDE, PRESERVATIVE FREE 10 ML: 5 INJECTION INTRAVENOUS at 11:00

## 2022-08-04 RX ADMIN — IPRATROPIUM BROMIDE AND ALBUTEROL SULFATE 1 AMPULE: 2.5; .5 SOLUTION RESPIRATORY (INHALATION) at 19:58

## 2022-08-04 RX ADMIN — IPRATROPIUM BROMIDE AND ALBUTEROL SULFATE 1 AMPULE: 2.5; .5 SOLUTION RESPIRATORY (INHALATION) at 16:39

## 2022-08-04 RX ADMIN — LINEZOLID 600 MG: 600 INJECTION, SOLUTION INTRAVENOUS at 09:55

## 2022-08-04 RX ADMIN — IPRATROPIUM BROMIDE AND ALBUTEROL SULFATE 1 AMPULE: 2.5; .5 SOLUTION RESPIRATORY (INHALATION) at 12:38

## 2022-08-04 RX ADMIN — SODIUM CHLORIDE: 9 INJECTION, SOLUTION INTRAVENOUS at 04:10

## 2022-08-04 RX ADMIN — SODIUM CHLORIDE: 9 INJECTION, SOLUTION INTRAVENOUS at 06:51

## 2022-08-04 RX ADMIN — PIPERACILLIN AND TAZOBACTAM 3375 MG: 3; .375 INJECTION, POWDER, FOR SOLUTION INTRAVENOUS at 21:55

## 2022-08-04 RX ADMIN — Medication 40 MG: at 10:58

## 2022-08-04 RX ADMIN — SODIUM CHLORIDE: 9 INJECTION, SOLUTION INTRAVENOUS at 09:52

## 2022-08-04 RX ADMIN — SODIUM CHLORIDE: 9 INJECTION, SOLUTION INTRAVENOUS at 02:02

## 2022-08-04 RX ADMIN — PIPERACILLIN AND TAZOBACTAM 3375 MG: 3; .375 INJECTION, POWDER, FOR SOLUTION INTRAVENOUS at 10:45

## 2022-08-04 RX ADMIN — IPRATROPIUM BROMIDE AND ALBUTEROL SULFATE 1 AMPULE: 2.5; .5 SOLUTION RESPIRATORY (INHALATION) at 09:00

## 2022-08-04 RX ADMIN — Medication 40 MG: at 21:50

## 2022-08-04 RX ADMIN — PERFLUTREN 1.5 ML: 6.52 INJECTION, SUSPENSION INTRAVENOUS at 14:59

## 2022-08-04 NOTE — PROGRESS NOTES
Hospital Medicine Progress Note     Date:  8/4/2022    PCP: No primary care provider on file. (Tel: None)    Date of Admission: 8/3/2022    Chief complaint:   Chief Complaint   Patient presents with    Shortness of Breath     Pt was picked up outside of [employer] [facility] , EMS reports pts confused not really answering, pt looks bad ,        Brief admission history: 76-year-old male with history of dermatitis, tobacco use disorder, marijuana use disorder, occasional alcohol use, (possible history of CHF, unknown ejection fraction), who was brought to the emergency room on 8/3/2022 for evaluation of encephalopathy. Patient was reportedly found confused outside of [facility]. Presentation to the emergency room, he has multiple lab and imaging studies that are abnormal, including slightly elevated LFTs, creatinine of 1.4 (baseline unknown), anion gap metabolic acidosis, elevated creatinine kinase of 5440, lactic acid of 2.3, anemia with low MCV. X-ray of right reports unremarkable for acute fracture. CT does chest reveals left upper lung infiltrate and multiple pulmonary nodules (for which follow-up CAT scan is recommended). He has nonzero troponin of 0.05, repeat of 0.04. Urine drug screen was unremarkable. He was admitted for management of bacterial pneumonia, sepsis secondary to pneumonia, anion gap metabolic acidosis, rhabdomyolysis, acute metabolic encephalopathy. Assessment/plan:  Bacterial pneumonia, sepsis secondary to pneumonia. Sepsis criteria includes fever of 101.5 °F, tachypnea with respiratory rate greater than 20, elevated lactic acid level of 2.3, altered mental status, in the setting of underlying infectious process. Blood cultures have been ordered. Continue Zyvox and Zosyn. Check MRSA nares. Lactic acidosis has resolved with intravenous fluid. Follow culture results. Acute respiratory failure with hypoxia.   Patient was reportedly hypoxic prior to presentation to the emergency room, although I could not see a documented low oxygen level. Hypoxia is likely secondary to underlying pneumonic process. Continue supplemental oxygen with plan to wean as tolerated. Continue breathing treatments. Acute metabolic encephalopathy. Likely secondary to underlying infectious process. Will continue with management of underlying infection and monitor mental status closely. Elevated creatinine level of 1.4, likely acute kidney injury on underlying CKD stage III. We do not have baseline creatinine established for this patient. Nonetheless, avoid nephrotoxic medications. Continue intravenous fluid and monitor renal function over time. Anion gap metabolic acidosis. Likely secondary to underlying infectious process, lactic acidosis, dehydration. Continue intravenous fluid and monitor electrolytes closely. Rhabdomyolysis, likely nontraumatic. Continue intravenous fluid and monitor creatinine kinase level, LFTs, renal function. Microcytic anemia. Iron studies consistent with iron deficiency anemia. Monitor hemoglobin closely. Follow-up pending occult stool. Multiple pulmonary nodules noted on CT of the chest.  Patient will benefit from follow-up CT-chest in about 6 months. Diet: Diet NPO Exceptions are: Ice Chips, Sips of Water with Meds    Code status: Full Code   ----------  Subjective  Patient appears to be confused at this time. Objective  Physical exam:  Vitals: /73   Pulse 77   Temp 99.4 °F (37.4 °C) (Axillary)   Resp 22   Ht 5' 7\" (1.702 m)   Wt 148 lb 9.4 oz (67.4 kg)   SpO2 95%   PF (!) 3 L/min   BMI 23.27 kg/m²   Gen/overall appearance: Not in acute distress. Confused. Unkept, poor hygiene. Head: Normocephalic, atraumatic  Eyes: EOMI, good acuity  ENT: Oral mucosa moist  Neck: No JVD, thyromegaly  CVS: Nml S1S2, no MRG, RRR  Pulm: Diffuse rhonchi. No crackles. Gastrointestinal: Soft, NT/ND, +BS  Musculoskeletal: No edema. Warm  Neuro: No focal deficit. Moves extremity spontaneously. Psychiatry: Appropriate affect. Not agitated. Skin: Warm, dry with normal turgor. No rash  Capillary refill: Brisk,< 3 seconds   Peripheral Pulses: +2 palpable, equal bilaterally     24HR INTAKE/OUTPUT:    Intake/Output Summary (Last 24 hours) at 8/4/2022 1223  Last data filed at 8/3/2022 1819  Gross per 24 hour   Intake 3140.45 ml   Output --   Net 3140.45 ml     I/O last 3 completed shifts: In: 3140.5 [I.V.:1485.4; IV Piggyback:1655.1]  Out: -   No intake/output data recorded.   Meds:    sodium chloride flush  10 mL IntraVENous 2 times per day    pantoprazole (PROTONIX) 40 mg injection  40 mg IntraVENous Q12H    ferrous sulfate  324 mg Oral Daily with breakfast    ipratropium-albuterol  1 ampule Inhalation Q4H WA    piperacillin-tazobactam  3,375 mg IntraVENous Q8H    linezolid  600 mg IntraVENous Q12H     Infusions:    sodium chloride      sodium chloride 150 mL/hr at 08/04/22 0952     PRN Meds: sodium chloride flush, sodium chloride, potassium chloride, magnesium sulfate, promethazine **OR** ondansetron, acetaminophen **OR** acetaminophen, perflutren lipid microspheres, labetalol, albuterol sulfate HFA    Labs/imaging:  CBC:   Recent Labs     08/03/22  1100 08/03/22 2004   WBC 10.6  --    HGB 11.3* 10.1*     --      BMP:    Recent Labs     08/03/22  1035   *   K 4.8   CL 97*   CO2 20*   BUN 28*   CREATININE 1.4*   GLUCOSE 104*     Hepatic:   Recent Labs     08/03/22  1035   *   ALT 41*   BILITOT 0.6   ALKPHOS 76       Paulette Wright MD  -------------------------------  Rounding hospitalist

## 2022-08-04 NOTE — PLAN OF CARE
Problem: Discharge Planning  Goal: Discharge to home or other facility with appropriate resources  Outcome: Progressing  Flowsheets (Taken 8/3/2022 3260)  Discharge to home or other facility with appropriate resources:   Identify barriers to discharge with patient and caregiver   Identify discharge learning needs (meds, wound care, etc)   Refer to discharge planning if patient needs post-hospital services based on physician order or complex needs related to functional status, cognitive ability or social support system   Arrange for needed discharge resources and transportation as appropriate     Problem: Safety - Adult  Goal: Free from fall injury  Outcome: Progressing     Problem: Skin/Tissue Integrity  Goal: Absence of new skin breakdown  Description: 1. Monitor for areas of redness and/or skin breakdown  2. Assess vascular access sites hourly  3. Every 4-6 hours minimum:  Change oxygen saturation probe site  4. Every 4-6 hours:  If on nasal continuous positive airway pressure, respiratory therapy assess nares and determine need for appliance change or resting period. Outcome: Progressing     Problem: Confusion  Goal: Confusion, delirium, dementia, or psychosis is improved or at baseline  Description: INTERVENTIONS:  1. Assess for possible contributors to thought disturbance, including medications, impaired vision or hearing, underlying metabolic abnormalities, dehydration, psychiatric diagnoses, and notify attending LIP  2. [place] high risk fall precautions, as indicated  3. Provide frequent short contacts to provide reality reorientation, refocusing and direction  4. Decrease environmental stimuli, including noise as appropriate  5. Monitor and intervene to maintain adequate nutrition, hydration, elimination, sleep and activity  6. If unable to ensure safety without constant attention obtain sitter and review sitter guidelines with assigned personnel  7.  Initiate Psychosocial CNS and Spiritual Care consult, as indicated  Outcome: Progressing

## 2022-08-04 NOTE — H&P
Hospital Medicine History & Physical      PCP: No primary care provider on file. Date of Admission: 8/3/2022    Date of Service: Pt seen/examined on 8/3/2022    Pt seen/examined face to face on and admitted as inpatient with expected LOS greater than two midnights due to medical therapy    Chief Complaint:    Chief Complaint   Patient presents with    Shortness of Breath     Pt was picked up outside of [employer] , EMS reports pts confused not really answering, pt looks bad ,         History Of Present Illness:      77 y.o. male who presented to [facility] with past medical history of HF resented ED with chief complaint of shortness of breath. History limited from patient as patient is encephalopathic. Per report patient was picked up from outside [employer] was confused answering,. Patient was noted to be hypoxic thus was transferred here for further evaluation        Past Medical History:    Unable to obtain patient encephalopathic  No past medical history on file. Past Surgical History:    Unable to obtain hx from patient  No past surgical history on file. Medications Prior to Admission:      Prior to Admission medications    Medication Sig Start Date End Date Taking? Authorizing Provider   albuterol sulfate HFA (PROAIR HFA) 108 (90 Base) MCG/ACT inhaler Inhale 1-2 puffs by mouth every 4 hours while awake for 7-10 days then PRN wheezing  Dispense with SPACER and Instruct on use. May sub Ventolin or Proventil as needed per [employer]. 2/7/20   SERGIO Massey   loratadine (CLARITIN) 10 MG tablet Take 1 tablet by mouth daily 2/7/20   Pawan Massey   acetaminophen (AMINOFEN) 325 MG tablet Take 2 tablets by mouth every 6 hours as needed for Pain 2/7/20   Pawan Massey   benzonatate (TESSALON PERLES) 100 MG capsule Take 1 capsule by mouth 3 times daily as needed for Cough 2/7/20   Pawan Massey       Allergies:  Patient has no known allergies.     Social History:          TOBACCO: reports that he has been smoking cigarettes. He has been smoking an average of 1 pack per day. He has never used smokeless tobacco.  ETOH:   reports no history of alcohol use. E-cigarette/Vaping       Questions Responses    E-cigarette/Vaping Use     Start Date     Passive Exposure     Quit Date     Counseling Given     Comments               Family History:    Unable to obtain patient encephalopathic     Family History reviewed with patient, and does not pertain and non-contributory to the current illness    No family history on file. REVIEW OF SYSTEMS:   Patient encephalopathic    PHYSICAL EXAM PERFORMED:    BP (!) 107/57   Pulse 79   Temp 98.4 °F (36.9 °C) (Axillary)   Resp 26   Wt 151 lb 7.3 oz (68.7 kg)   SpO2 95%   PF (!) 3 L/min   BMI 23.72 kg/m²     General appearance:  mild acute distress, appears older than stated age  HEENT:   atraumatic, sclera anicteric, Conjunctivae clear. Neck: Supple,Trachea midline, no goiter  Respiratory:minimal accessory muscle usage, Normal respiratory effort. Clear to auscultation, bilaterally without wheezing  Cardiovascular:  Regular rate and rhythm, capillary refill 2 seconds  Abdomen: Soft, non-tender, non-distended with normal bowel sounds. Musculoskeletal:  No clubbing, cyanosis. trace edema LE bilaterally. Skin: turgor normal.  No new rashes or lesions. Dried blood on the right nose,   Neurologic: Alert and oriented x4, no new focal sensory/motor deficits.      Labs:     Recent Labs     08/03/22  1100 08/03/22 2004   WBC 10.6  --    HGB 11.3* 10.1*   HCT 35.0* 31.8*     --      Recent Labs     08/03/22  1035   *   K 4.8   CL 97*   CO2 20*   BUN 28*   CREATININE 1.4*   CALCIUM 8.3     Recent Labs     08/03/22  1035   *   ALT 41*   BILITOT 0.6   ALKPHOS 76     Recent Labs     08/03/22 2005   INR 1.35*     Recent Labs     08/03/22 2004   TROPONINI 0.05*       Urinalysis:      Lab Results   Component Value Date/Time    NITRU Negative

## 2022-08-04 NOTE — PROGRESS NOTES
Comprehensive Nutrition Assessment    Type and Reason for Visit:  Initial, Positive Nutrition Screen    Nutrition Recommendations/Plan:   Continue NPO  Advance diet as appropriate   Monitor intakes for possible need of ONS     Malnutrition Assessment:  Malnutrition Status:  Insufficient data (08/04/22 1235)    Context:  Chronic Illness       Nutrition Assessment:    Positive nutrition screen. Pt admitted for SOB, cough, lethargy, and encephalopathy. Pt is homeless and has been sleeping outside. [place] in rt ear. MRI ordered to rule out CVA. Suspected SIOMARA. Pt is currently NPO. Pt with coughing after drinking thins. Will monitor for diet advancement and possible need of ONS. Nutrition Related Findings:    Oriented to person / time. Wound Type: None       Current Nutrition Intake & Therapies:    Average Meal Intake: NPO  Average Supplements Intake: NPO  Diet NPO Exceptions are: Ice Chips, Sips of Water with Meds    Anthropometric Measures:  Height: 5' 7\" (170.2 cm)  Ideal Body Weight (IBW): 148 lbs (67 kg)    Admission Body Weight: 151 lb (68.5 kg)  Current Body Weight: 148 lb (67.1 kg), 100 % IBW. Weight Source: Bed Scale  Current BMI (kg/m2): 23.2  Weight Adjustment For: No Adjustment  BMI Categories: Normal Weight (BMI 18.5-24. 9)    Estimated Daily Nutrient Needs:  Energy Requirements Based On: Kcal/kg  Weight Used for Energy Requirements: Current  Energy (kcal/day): 5725-0433 (25-30kcal/67kg)  Weight Used for Protein Requirements: Current  Protein (g/day): 80-94 (1.2-1.4g/67kg)  Method Used for Fluid Requirements: 1 ml/kcal  Fluid (ml/day): 1 ml/kcal    Nutrition Diagnosis:   Inadequate oral intake related to cognitive or neurological impairment, swallowing difficulty as evidenced by NPO or clear liquid status due to medical condition    Nutrition Interventions:   Food and/or Nutrient Delivery: Continue NPO (Begin oral diet as appropriate)  Nutrition Education/Counseling: Education not indicated  Coordination of Nutrition Care: Continue to monitor while inpatient     Goals:  Goals: other (specify)  Specify Other Goals: Initiate most appropriate form of nutrition by next RD assessment. Nutrition Monitoring and Evaluation:   Behavioral-Environmental Outcomes: None Identified  Food/Nutrient Intake Outcomes: Diet Advancement/Tolerance, Food and Nutrient Intake  Physical Signs/Symptoms Outcomes: Chewing or Swallowing, GI Status, Nausea or Vomiting, Fluid Status or Edema, Meal Time Behavior, Nutrition Focused Physical Findings, Skin, Weight    Discharge Planning:     Too soon to determine     Josetteclaritaca Vandana, [address], LD  Contact: [phone]

## 2022-08-04 NOTE — PROGRESS NOTES
Speech Language Pathology    3:11 PM:  Evaluation attempted. Patient unavailable out of room at diagnostics. ST to re-attempt as schedule permits unless otherwise notified. 4:25 PM:  Evaluation re-attempted. Patient awakens upon SLP entry to room, but quickly returns to sleep. ST to re-attempt evaluation 8/5/2022 pending mental status unless otherwise  notified. Family at bedside aware. Thank you. Sharon Cortés, [address] [facility], [id number]  Speech-Language Pathologist  Portable phone: [phone]

## 2022-08-04 NOTE — CONSULTS
elevated but should not cause clinically significant disease    3) AMS  - head CT unremarkable  - he could have underlying mental health disease    4) anemia  - iron studies suggestive of mild iron deficiency  - on FeSO4    5) hypoxia  - possible pneumonia  - chest CT shows emphysema  - on empiric zyvox and zosyn    6) FEN  - hyponatremia   - monitor
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
## 2022-08-04 NOTE — CONSULTS
PALLIATIVE MEDICINE CONSULTATION     Patient name:Logan Schwartz   [facility]:[id number]    :1945  Room/Bed:G7T-8070/5264-01   LOS: 1 day         Date of consult:2022    Consult Information    Inpatient consult to Palliative Care  Consult performed by: JAGRUTI Steven CNP  Consult ordered by: Esperanza Brandon DO       Reason for Consult: Goals of Care, Code Status    ASSESSMENT/RECOMMENDATIONS     77 y.o. male with AMS and dyspnea. Symptom Management:  AMS: Patient is hard of hearing but he was oriented x2 (person and time) today. Patient did not appear to be oriented to his situation and did not appear to be decisional today. Dyspnea: Patient on 2 liters of oxygen via a nasal cannula for relief. Goals of Care: Met patient at his bedside. Patient was oriented x2 (person and time). Patient did not appear to be decisional today. Met patient's spouse Estela Cabral at the bedside. Attempted to review patient's current health status, goals of care and code status. However, patient's spouse appeared to be very confused today (upon entering the room Estela Cabral was only wearing a shirt; upon entering the room a second time Estela Cabral was naked; upon entering the room a third time Estela Cabral was fully clothed). Estela Cabral was unable to answer any medical questions regarding the patient (health history, current health status, goals and code status preferences). Estela Cabral did not appear to be decisional today and she indicated she was not sure if the patient had ever filled out any POA paperwork in the past.  Estela Cabral was unable to verify any other living relatives for the patient during my visit today (it was difficult for Estela Cabral to provide direct answers to questions during my visit). Estela Cabral also indicated both she and the patient are currently living in their car. I notified the patient's  of the events that occurred during my visit and reviewed the patient's case.   Palliative CVA and CHF who presented to [facility] with shortness of breath. Patient was admitted with pneumonia, hypoxia, renal insufficiency and severe sepsis. Palliative Medicine SymptomScreening/ROS:    Review of Systems   Unable to perform ROS: Mental status change   All other systems reviewed and are negative. Patient unable to complete full ROS due to current cognitive status. Information that is obtained from nursing and chart. Palliative Performance Scale:     [] 60%  Amb reduced; Sig dz. Can't do hobbies/housework; Intake normal or reduced, Occasional assist; LOC full/confusion   [x] 50%  Mainly sit/lie; Extensive disease. Mainly assist, Intake normal or reduced; Occasional assist; LOC full/confusion   [] 40%  Mainly in bed; Extensive disease; Mainly assist; Intake normal or reduced; Occasional assist; LOC full/confusion   [] 30%  Bed bound, Extensive disease; Total care; Intake reduced; LOC full/confusion   [] 20%  Bed bound; Extensive disease; Total care; Intake minimal; Drowsy/coma   [] 10%  Bed bound; Extensive disease; Total care; Mouth care only; Drowsy/coma   []  0%   Death     Home med list and hospital medications reviewed in chart as of 8/4/2022     EXAM     Vitals:    08/04/22 1400   BP:    Pulse: 80   Resp: 20   Temp:    SpO2:        Physical Exam  Vitals reviewed. Constitutional:       Appearance: He is ill-appearing. Interventions: Nasal cannula in place. HENT:      Head: Normocephalic and atraumatic. Nose: Nose normal.   Eyes:      Extraocular Movements: Extraocular movements intact. Pupils: Pupils are equal, round, and reactive to light. Cardiovascular:      Rate and Rhythm: Normal rate. Pulses: Normal pulses. Pulmonary:      Breath sounds: Rhonchi (bilaterally) present. Abdominal:      General: Bowel sounds are normal.      Palpations: Abdomen is soft. Musculoskeletal:      Cervical back: Neck supple. Right lower leg: Edema (2+) present. Left lower leg: Edema (2+) present. Skin:     General: Skin is warm and dry. Coloration: Skin is pale. Neurological:      Comments: Oriented x2 (person and time). Patient did not appear to be decisional today. Psychiatric:      Comments: Lethargic, confused and hard of hearing.            Current labs in the epic chart reviewed as of 8/4/2022   Review of previous notes, admits, labs, radiology and testing relevant to this consult done in this chart today 8/4/2022      Total time: 90 minutes  >50% of time spent counseling patient at bedside or POA/family member if applicable , reviewing information and discussing care, coordinating with care team  Signed By: Electronically signed by JAGRUTI Barahona CNP on 8/4/2022 at 2:49 PM  Palliative Medicine   [phone]    August 4, 2022

## 2022-08-05 LAB
A/G RATIO: 0.5 (ref 1.1–2.2)
ALBUMIN SERPL-MCNC: 1.9 G/DL (ref 3.4–5)
ALP BLD-CCNC: 50 U/L (ref 40–129)
ALT SERPL-CCNC: 27 U/L (ref 10–40)
ANION GAP SERPL CALCULATED.3IONS-SCNC: 11 MMOL/L (ref 3–16)
AST SERPL-CCNC: 112 U/L (ref 15–37)
BASOPHILS ABSOLUTE: 0 K/UL (ref 0–0.2)
BASOPHILS RELATIVE PERCENT: 0.7 %
BILIRUB SERPL-MCNC: 0.4 MG/DL (ref 0–1)
BUN BLDV-MCNC: 15 MG/DL (ref 7–20)
CALCIUM SERPL-MCNC: 7.3 MG/DL (ref 8.3–10.6)
CHLORIDE BLD-SCNC: 103 MMOL/L (ref 99–110)
CO2: 18 MMOL/L (ref 21–32)
CREAT SERPL-MCNC: 1 MG/DL (ref 0.8–1.3)
EOSINOPHILS ABSOLUTE: 0 K/UL (ref 0–0.6)
EOSINOPHILS RELATIVE PERCENT: 0.7 %
GFR AFRICAN AMERICAN: >60
GFR NON-AFRICAN AMERICAN: >60
GLUCOSE BLD-MCNC: 82 MG/DL (ref 70–99)
HCT VFR BLD CALC: 27.5 % (ref 40.5–52.5)
HCT VFR BLD CALC: 28.4 % (ref 40.5–52.5)
HEMOGLOBIN: 8.9 G/DL (ref 13.5–17.5)
HEMOGLOBIN: 9 G/DL (ref 13.5–17.5)
LYMPHOCYTES ABSOLUTE: 1.2 K/UL (ref 1–5.1)
LYMPHOCYTES RELATIVE PERCENT: 21.5 %
MAGNESIUM: 1.6 MG/DL (ref 1.8–2.4)
MCH RBC QN AUTO: 23.5 PG (ref 26–34)
MCHC RBC AUTO-ENTMCNC: 32.6 G/DL (ref 31–36)
MCV RBC AUTO: 72 FL (ref 80–100)
MONOCYTES ABSOLUTE: 0.5 K/UL (ref 0–1.3)
MONOCYTES RELATIVE PERCENT: 8.5 %
MRSA SCREEN RT-PCR: NORMAL
NEUTROPHILS ABSOLUTE: 4 K/UL (ref 1.7–7.7)
NEUTROPHILS RELATIVE PERCENT: 68.6 %
PDW BLD-RTO: 20.2 % (ref 12.4–15.4)
PHOSPHORUS: 1.8 MG/DL (ref 2.5–4.9)
PLATELET # BLD: 136 K/UL (ref 135–450)
PMV BLD AUTO: 8.6 FL (ref 5–10.5)
POTASSIUM SERPL-SCNC: 3.3 MMOL/L (ref 3.5–5.1)
RBC # BLD: 3.82 M/UL (ref 4.2–5.9)
SODIUM BLD-SCNC: 132 MMOL/L (ref 136–145)
TOTAL CK: 1481 U/L (ref 39–308)
TOTAL PROTEIN: 5.8 G/DL (ref 6.4–8.2)
TROPONIN: 0.02 NG/ML
TROPONIN: 0.03 NG/ML
WBC # BLD: 5.8 K/UL (ref 4–11)

## 2022-08-05 PROCEDURE — 2580000003 HC RX 258: Performed by: INTERNAL MEDICINE

## 2022-08-05 PROCEDURE — 94640 AIRWAY INHALATION TREATMENT: CPT

## 2022-08-05 PROCEDURE — 6360000002 HC RX W HCPCS: Performed by: INTERNAL MEDICINE

## 2022-08-05 PROCEDURE — 6370000000 HC RX 637 (ALT 250 FOR IP): Performed by: INTERNAL MEDICINE

## 2022-08-05 PROCEDURE — 1200000000 HC SEMI PRIVATE

## 2022-08-05 PROCEDURE — 83735 ASSAY OF MAGNESIUM: CPT

## 2022-08-05 PROCEDURE — 84100 ASSAY OF PHOSPHORUS: CPT

## 2022-08-05 PROCEDURE — 85025 COMPLETE CBC W/AUTO DIFF WBC: CPT

## 2022-08-05 PROCEDURE — 92610 EVALUATE SWALLOWING FUNCTION: CPT

## 2022-08-05 PROCEDURE — 94761 N-INVAS EAR/PLS OXIMETRY MLT: CPT

## 2022-08-05 PROCEDURE — 2500000003 HC RX 250 WO HCPCS: Performed by: INTERNAL MEDICINE

## 2022-08-05 PROCEDURE — 84484 ASSAY OF TROPONIN QUANT: CPT

## 2022-08-05 PROCEDURE — 85014 HEMATOCRIT: CPT

## 2022-08-05 PROCEDURE — C9113 INJ PANTOPRAZOLE SODIUM, VIA: HCPCS | Performed by: INTERNAL MEDICINE

## 2022-08-05 PROCEDURE — 2700000000 HC OXYGEN THERAPY PER DAY

## 2022-08-05 PROCEDURE — 36415 COLL VENOUS BLD VENIPUNCTURE: CPT

## 2022-08-05 PROCEDURE — 82550 ASSAY OF CK (CPK): CPT

## 2022-08-05 PROCEDURE — 80053 COMPREHEN METABOLIC PANEL: CPT

## 2022-08-05 PROCEDURE — 85018 HEMOGLOBIN: CPT

## 2022-08-05 RX ORDER — MAGNESIUM SULFATE IN WATER 40 MG/ML
2000 INJECTION, SOLUTION INTRAVENOUS ONCE
Status: COMPLETED | OUTPATIENT
Start: 2022-08-05 | End: 2022-08-05

## 2022-08-05 RX ADMIN — IPRATROPIUM BROMIDE AND ALBUTEROL SULFATE 1 AMPULE: 2.5; .5 SOLUTION RESPIRATORY (INHALATION) at 07:47

## 2022-08-05 RX ADMIN — POTASSIUM CHLORIDE 10 MEQ: 7.46 INJECTION, SOLUTION INTRAVENOUS at 15:02

## 2022-08-05 RX ADMIN — MAGNESIUM SULFATE HEPTAHYDRATE 2000 MG: 40 INJECTION, SOLUTION INTRAVENOUS at 10:53

## 2022-08-05 RX ADMIN — Medication 40 MG: at 20:22

## 2022-08-05 RX ADMIN — IPRATROPIUM BROMIDE AND ALBUTEROL SULFATE 1 AMPULE: 2.5; .5 SOLUTION RESPIRATORY (INHALATION) at 19:53

## 2022-08-05 RX ADMIN — IPRATROPIUM BROMIDE AND ALBUTEROL SULFATE 1 AMPULE: 2.5; .5 SOLUTION RESPIRATORY (INHALATION) at 11:55

## 2022-08-05 RX ADMIN — PIPERACILLIN AND TAZOBACTAM 3375 MG: 3; .375 INJECTION, POWDER, FOR SOLUTION INTRAVENOUS at 18:25

## 2022-08-05 RX ADMIN — Medication 40 MG: at 08:34

## 2022-08-05 RX ADMIN — IPRATROPIUM BROMIDE AND ALBUTEROL SULFATE 1 AMPULE: 2.5; .5 SOLUTION RESPIRATORY (INHALATION) at 15:55

## 2022-08-05 RX ADMIN — PIPERACILLIN AND TAZOBACTAM 3375 MG: 3; .375 INJECTION, POWDER, FOR SOLUTION INTRAVENOUS at 11:02

## 2022-08-05 RX ADMIN — LINEZOLID 600 MG: 600 INJECTION, SOLUTION INTRAVENOUS at 09:53

## 2022-08-05 RX ADMIN — SODIUM CHLORIDE, PRESERVATIVE FREE 10 ML: 5 INJECTION INTRAVENOUS at 20:23

## 2022-08-05 RX ADMIN — LINEZOLID 600 MG: 600 INJECTION, SOLUTION INTRAVENOUS at 00:04

## 2022-08-05 RX ADMIN — PIPERACILLIN AND TAZOBACTAM 3375 MG: 3; .375 INJECTION, POWDER, FOR SOLUTION INTRAVENOUS at 04:43

## 2022-08-05 RX ADMIN — SODIUM PHOSPHATE, MONOBASIC, MONOHYDRATE 30 MMOL: 276; 142 INJECTION, SOLUTION INTRAVENOUS at 10:40

## 2022-08-05 RX ADMIN — LINEZOLID 600 MG: 600 INJECTION, SOLUTION INTRAVENOUS at 22:48

## 2022-08-05 RX ADMIN — SODIUM CHLORIDE: 9 INJECTION, SOLUTION INTRAVENOUS at 00:02

## 2022-08-05 RX ADMIN — POTASSIUM CHLORIDE 10 MEQ: 7.46 INJECTION, SOLUTION INTRAVENOUS at 13:57

## 2022-08-05 NOTE — PROGRESS NOTES
Pts wife came to nurses station and asked where the smoking room was. Explained to wife that there is no smoking allowed inside this building or on the grounds. Speech has kept patient NPO, wife notified. I came in room and found patient holding a bagel wrapped in plastic with a bag of cheese and boiled egg laying on him. Patient had not ate any of these things. I told wife patient is not awake enough to be eating. She said that she thought him not eating was a suggestion. Educated wife on aspiration and need for patient remain NPO.

## 2022-08-05 NOTE — PROGRESS NOTES
Nephrology ([facility]) Progress Note    CC: SIOMARA    Subjective:    HPI:  Breathing comfortably. Confused. Renal function improved. BP marginal.  ROS:  In bed. No fever. [address]:  medications reviewed. Objective:  Blood pressure (!) 96/56, pulse 79, temperature 98.5 °F (36.9 °C), temperature source Oral, resp. rate 27, height 5' 7\" (1.702 m), weight 151 lb 0.2 oz (68.5 kg), SpO2 93 %, peak flow (!) 3 L/min. Intake/Output Summary (Last 24 hours) at 8/5/2022 1530  Last data filed at 8/5/2022 1524  Gross per 24 hour   Intake 2177.97 ml   Output --   Net 2177.97 ml     General:  NAD, confused  Chest:  CTAB, poor effort  CVS:  RRR  Abdominal:  NTND, soft, decreased BS  Extremities:  no edema  Skin:  no rash    Labs:  Renal panel:  Lab Results   Component Value Date/Time     (L) 08/05/2022 06:25 AM    K 3.3 (L) 08/05/2022 06:25 AM    CO2 18 (L) 08/05/2022 06:25 AM    BUN 15 08/05/2022 06:25 AM    CREATININE 1.0 08/05/2022 06:25 AM    CALCIUM 7.3 (L) 08/05/2022 06:25 AM    PHOS 1.8 (L) 08/05/2022 06:25 AM    MG 1.60 (L) 08/05/2022 06:25 AM     CBC:  Lab Results   Component Value Date/Time    WBC 5.8 08/05/2022 06:25 AM    HGB 9.0 (L) 08/05/2022 06:25 AM    HCT 27.5 (L) 08/05/2022 06:25 AM     08/05/2022 06:25 AM       Assessment/Plan:  Reviewed old records and labs.     1) SIOMARA  - unknown baseline  - improved  - decrease IVF to 75 ml/hr     2) rhabdomyolysis  - CK improved     3) AMS  - head CT unremarkable  - he could have underlying mental health disease     4) anemia  - iron studies suggestive of mild iron deficiency  - on FeSO4     5) hypoxia  - possible pneumonia  - chest CT shows emphysema  - on empiric zyvox and zosyn     6) FEN  - hyponatremia              - monitor  - hypophosphatemia   - supplement  - hypokalemia   - supplement  - metabolic acidosis   - monitor  - hypomagnesemia   - would suggest PO supplementation going forward if possible as MgSO4 will cause increased urinary losses
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
## 2022-08-05 NOTE — PROGRESS NOTES
During shift patient remained in his altered state. At beginning of shift found spouse feeding patient again. ( Also occurred during day) re-educated on NPO status.
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
## 2022-08-05 NOTE — PROGRESS NOTES
Hospital Medicine Progress Note     Date:  8/5/2022    PCP: No primary care provider on file. (Tel: None)    Date of Admission: 8/3/2022    Chief complaint:   Chief Complaint   Patient presents with    Shortness of Breath     Pt was picked up outside of [facility] , EMS reports pts confused not really answering, pt looks bad ,        Brief admission history: 67-year-old male with history of dermatitis, tobacco use disorder, marijuana use disorder, occasional alcohol use, (possible history of CHF, unknown ejection fraction), who was brought to the emergency room on 8/3/2022 for evaluation of encephalopathy. Patient was reportedly found confused outside of [facility]. Presentation to the emergency room, he has multiple lab and imaging studies that are abnormal, including slightly elevated LFTs, creatinine of 1.4 (baseline unknown), anion gap metabolic acidosis, elevated creatinine kinase of 5440, lactic acid of 2.3, anemia with low MCV. X-ray of right reports unremarkable for acute fracture. CT does chest reveals left upper lung infiltrate and multiple pulmonary nodules (for which follow-up CAT scan is recommended). He has nonzero troponin of 0.05, repeat of 0.04. Urine drug screen was unremarkable. He was admitted for management of bacterial pneumonia, sepsis secondary to pneumonia, anion gap metabolic acidosis, rhabdomyolysis, acute metabolic encephalopathy. Assessment/plan:  Bacterial pneumonia, sepsis secondary to pneumonia. Sepsis criteria includes fever of 101.5 °F, tachypnea with respiratory rate greater than 20, elevated lactic acid level of 2.3, altered mental status, in the setting of underlying infectious process. Blood cultures pending. MRSA nares negative. Continue Zyvox and Zosyn. De-escalate zyvox soon. Follow culture results. Acute respiratory failure with hypoxia.   Patient was reportedly hypoxic prior to presentation to the emergency room, although I could not see a documented low oxygen level. Hypoxia is likely secondary to underlying pneumonic process. Continue supplemental oxygen with plan to wean as tolerated. Continue breathing treatments. Acute metabolic encephalopathy. Likely secondary to underlying infectious process. Will continue with management of underlying infection and monitor mental status closely. Acute kidney injury, present on admission. Creatinine is improved with intravenous fluid administration. Anion gap metabolic acidosis, improved. Likely secondary to underlying infectious process, lactic acidosis, dehydration. Continue intravenous fluid and monitor electrolytes closely. Rhabdomyolysis, likely nontraumatic, improving. Continue intravenous fluid and monitor creatinine kinase level, LFTs, renal function. Microcytic anemia. Iron studies consistent with iron deficiency anemia. Slight downtrending of hemoglobin likely secondary to hemodilution effect. Monitor hemoglobin closely. Follow-up pending occult stool. Multiple pulmonary nodules noted on CT of the chest.  Patient will benefit from follow-up CT-chest in about 6 months. Electrolyte abnormalities, including hyponatremia, hypokalemia, hypomagnesemia and hypophosphatemia. Replacement has been ordered. Diet: ADULT DIET; Dysphagia - Minced and Moist    Code status: Full Code   ----------  Subjective  Patient remains significantly confused. Wife at bedside, asking me for cigarettes so she can smoke in the hospital; advised that she cannot smoke in the hospital.    Objective  Physical exam:  Vitals: /69   Pulse 71   Temp 99.3 °F (37.4 °C) (Oral)   Resp 20   Ht 5' 7\" (1.702 m)   Wt 151 lb 0.2 oz (68.5 kg)   SpO2 95%   PF (!) 3 L/min   BMI 23.65 kg/m²   Gen/overall appearance: Not in acute distress. Confused. Unkept, poor hygiene.   Head: Normocephalic, atraumatic  Eyes: EOMI, good acuity  ENT: Oral mucosa moist  Neck: No JVD, thyromegaly  CVS: Nml S1S2, no MRG, RRR  Pulm: Diffuse rhonchi. No crackles. Gastrointestinal: Soft, NT/ND, +BS  Musculoskeletal: No edema. Warm  Neuro: No focal deficit. Moves extremity spontaneously. Psychiatry: Appropriate affect. Not agitated. Skin: Warm, dry with normal turgor. No rash  Capillary refill: Brisk,< 3 seconds   Peripheral Pulses: +2 palpable, equal bilaterally     24HR INTAKE/OUTPUT:    Intake/Output Summary (Last 24 hours) at 8/5/2022 0952  Last data filed at 8/5/2022 0534  Gross per 24 hour   Intake 0 ml   Output --   Net 0 ml       No intake/output data recorded. No intake/output data recorded. Meds:    sodium phosphate IVPB  30 mmol IntraVENous Once    potassium bicarb-citric acid  40 mEq Oral Daily    magnesium sulfate  2,000 mg IntraVENous Once    sodium chloride flush  10 mL IntraVENous 2 times per day    pantoprazole (PROTONIX) 40 mg injection  40 mg IntraVENous Q12H    ferrous sulfate  324 mg Oral Daily with breakfast    ipratropium-albuterol  1 ampule Inhalation Q4H WA    piperacillin-tazobactam  3,375 mg IntraVENous Q8H    linezolid  600 mg IntraVENous Q12H     Infusions:    sodium chloride      sodium chloride 150 mL/hr at 08/05/22 0002     PRN Meds: sodium chloride flush, sodium chloride, potassium chloride, magnesium sulfate, promethazine **OR** ondansetron, acetaminophen **OR** acetaminophen, labetalol, albuterol sulfate HFA    Labs/imaging:  CBC:   Recent Labs     08/03/22  1100 08/03/22  2004 08/04/22  1731 08/05/22  0033 08/05/22  0625   WBC 10.6  --   --   --  5.8   HGB 11.3*   < > 9.9* 8.9* 9.0*     --   --   --  136    < > = values in this interval not displayed.        BMP:    Recent Labs     08/03/22  1035 08/05/22  0625   * 132*   K 4.8 3.3*   CL 97* 103   CO2 20* 18*   BUN 28* 15   CREATININE 1.4* 1.0   GLUCOSE 104* 82       Hepatic:   Recent Labs     08/03/22  1035 08/05/22  0625   * 112*   ALT 41* 27   BILITOT 0.6 0.4   ALKPHOS 76 50         Abidemi B Cassandra, MD  -------------------------------  Rounding hospitalist

## 2022-08-06 ENCOUNTER — APPOINTMENT (OUTPATIENT)
Dept: GENERAL RADIOLOGY | Age: 77
DRG: 871 | End: 2022-08-06
Payer: COMMERCIAL

## 2022-08-06 LAB
A/G RATIO: 0.5 (ref 1.1–2.2)
ALBUMIN SERPL-MCNC: 1.9 G/DL (ref 3.4–5)
ALP BLD-CCNC: 54 U/L (ref 40–129)
ALT SERPL-CCNC: 26 U/L (ref 10–40)
ANION GAP SERPL CALCULATED.3IONS-SCNC: 12 MMOL/L (ref 3–16)
AST SERPL-CCNC: 92 U/L (ref 15–37)
BASOPHILS ABSOLUTE: 0 K/UL (ref 0–0.2)
BASOPHILS RELATIVE PERCENT: 0.6 %
BILIRUB SERPL-MCNC: 0.4 MG/DL (ref 0–1)
BUN BLDV-MCNC: 9 MG/DL (ref 7–20)
CALCIUM SERPL-MCNC: 7.5 MG/DL (ref 8.3–10.6)
CHLORIDE BLD-SCNC: 104 MMOL/L (ref 99–110)
CO2: 20 MMOL/L (ref 21–32)
CREAT SERPL-MCNC: 0.9 MG/DL (ref 0.8–1.3)
EOSINOPHILS ABSOLUTE: 0.1 K/UL (ref 0–0.6)
EOSINOPHILS RELATIVE PERCENT: 1.9 %
FOLATE: 4.03 NG/ML (ref 4.78–24.2)
GFR AFRICAN AMERICAN: >60
GFR NON-AFRICAN AMERICAN: >60
GLUCOSE BLD-MCNC: 88 MG/DL (ref 70–99)
HCT VFR BLD CALC: 30.7 % (ref 40.5–52.5)
HEMOGLOBIN: 9.9 G/DL (ref 13.5–17.5)
LYMPHOCYTES ABSOLUTE: 1 K/UL (ref 1–5.1)
LYMPHOCYTES RELATIVE PERCENT: 19.6 %
MAGNESIUM: 1.7 MG/DL (ref 1.8–2.4)
MCH RBC QN AUTO: 23.2 PG (ref 26–34)
MCHC RBC AUTO-ENTMCNC: 32.3 G/DL (ref 31–36)
MCV RBC AUTO: 71.8 FL (ref 80–100)
MONOCYTES ABSOLUTE: 0.4 K/UL (ref 0–1.3)
MONOCYTES RELATIVE PERCENT: 7.6 %
NEUTROPHILS ABSOLUTE: 3.7 K/UL (ref 1.7–7.7)
NEUTROPHILS RELATIVE PERCENT: 70.3 %
PDW BLD-RTO: 20.8 % (ref 12.4–15.4)
PHOSPHORUS: 2.3 MG/DL (ref 2.5–4.9)
PLATELET # BLD: 149 K/UL (ref 135–450)
PMV BLD AUTO: 8.4 FL (ref 5–10.5)
POTASSIUM SERPL-SCNC: 3.1 MMOL/L (ref 3.5–5.1)
RBC # BLD: 4.29 M/UL (ref 4.2–5.9)
SODIUM BLD-SCNC: 136 MMOL/L (ref 136–145)
TOTAL CK: 830 U/L (ref 39–308)
TOTAL PROTEIN: 5.9 G/DL (ref 6.4–8.2)
TROPONIN: 0.01 NG/ML
TROPONIN: 0.02 NG/ML
TROPONIN: <0.01 NG/ML
TROPONIN: <0.01 NG/ML
VITAMIN B-12: 398 PG/ML (ref 211–911)
WBC # BLD: 5.3 K/UL (ref 4–11)

## 2022-08-06 PROCEDURE — C9113 INJ PANTOPRAZOLE SODIUM, VIA: HCPCS | Performed by: INTERNAL MEDICINE

## 2022-08-06 PROCEDURE — 82550 ASSAY OF CK (CPK): CPT

## 2022-08-06 PROCEDURE — 6370000000 HC RX 637 (ALT 250 FOR IP): Performed by: INTERNAL MEDICINE

## 2022-08-06 PROCEDURE — 83735 ASSAY OF MAGNESIUM: CPT

## 2022-08-06 PROCEDURE — 2580000003 HC RX 258: Performed by: INTERNAL MEDICINE

## 2022-08-06 PROCEDURE — 1200000000 HC SEMI PRIVATE

## 2022-08-06 PROCEDURE — 85025 COMPLETE CBC W/AUTO DIFF WBC: CPT

## 2022-08-06 PROCEDURE — 82607 VITAMIN B-12: CPT

## 2022-08-06 PROCEDURE — 2700000000 HC OXYGEN THERAPY PER DAY

## 2022-08-06 PROCEDURE — 87341 HEP B SURFACE AG NEUTRLZJ IA: CPT

## 2022-08-06 PROCEDURE — 80074 ACUTE HEPATITIS PANEL: CPT

## 2022-08-06 PROCEDURE — 71045 X-RAY EXAM CHEST 1 VIEW: CPT

## 2022-08-06 PROCEDURE — 6360000002 HC RX W HCPCS: Performed by: INTERNAL MEDICINE

## 2022-08-06 PROCEDURE — 94761 N-INVAS EAR/PLS OXIMETRY MLT: CPT

## 2022-08-06 PROCEDURE — 84100 ASSAY OF PHOSPHORUS: CPT

## 2022-08-06 PROCEDURE — 2500000003 HC RX 250 WO HCPCS: Performed by: INTERNAL MEDICINE

## 2022-08-06 PROCEDURE — 84484 ASSAY OF TROPONIN QUANT: CPT

## 2022-08-06 PROCEDURE — 36415 COLL VENOUS BLD VENIPUNCTURE: CPT

## 2022-08-06 PROCEDURE — 94640 AIRWAY INHALATION TREATMENT: CPT

## 2022-08-06 PROCEDURE — 80053 COMPREHEN METABOLIC PANEL: CPT

## 2022-08-06 PROCEDURE — 82746 ASSAY OF FOLIC ACID SERUM: CPT

## 2022-08-06 RX ORDER — DOCUSATE SODIUM 100 MG/1
100 CAPSULE, LIQUID FILLED ORAL 2 TIMES DAILY
Status: DISCONTINUED | OUTPATIENT
Start: 2022-08-06 | End: 2022-08-10 | Stop reason: HOSPADM

## 2022-08-06 RX ADMIN — SODIUM CHLORIDE 25 ML: 9 INJECTION, SOLUTION INTRAVENOUS at 22:23

## 2022-08-06 RX ADMIN — FERROUS SULFATE TAB EC 324 MG (65 MG FE EQUIVALENT) 324 MG: 324 (65 FE) TABLET DELAYED RESPONSE at 08:22

## 2022-08-06 RX ADMIN — IPRATROPIUM BROMIDE AND ALBUTEROL SULFATE 1 AMPULE: 2.5; .5 SOLUTION RESPIRATORY (INHALATION) at 15:44

## 2022-08-06 RX ADMIN — IPRATROPIUM BROMIDE AND ALBUTEROL SULFATE 1 AMPULE: 2.5; .5 SOLUTION RESPIRATORY (INHALATION) at 11:57

## 2022-08-06 RX ADMIN — LINEZOLID 600 MG: 600 INJECTION, SOLUTION INTRAVENOUS at 09:28

## 2022-08-06 RX ADMIN — LINEZOLID 600 MG: 600 INJECTION, SOLUTION INTRAVENOUS at 22:24

## 2022-08-06 RX ADMIN — SODIUM CHLORIDE, PRESERVATIVE FREE 10 ML: 5 INJECTION INTRAVENOUS at 20:17

## 2022-08-06 RX ADMIN — PIPERACILLIN AND TAZOBACTAM 3375 MG: 3; .375 INJECTION, POWDER, FOR SOLUTION INTRAVENOUS at 18:31

## 2022-08-06 RX ADMIN — Medication 40 MG: at 08:21

## 2022-08-06 RX ADMIN — POTASSIUM PHOSPHATE, MONOBASIC POTASSIUM PHOSPHATE, DIBASIC 10 MMOL: 224; 236 INJECTION, SOLUTION, CONCENTRATE INTRAVENOUS at 12:08

## 2022-08-06 RX ADMIN — DOCUSATE SODIUM 100 MG: 100 CAPSULE, LIQUID FILLED ORAL at 12:07

## 2022-08-06 RX ADMIN — PIPERACILLIN AND TAZOBACTAM 3375 MG: 3; .375 INJECTION, POWDER, FOR SOLUTION INTRAVENOUS at 11:29

## 2022-08-06 RX ADMIN — IPRATROPIUM BROMIDE AND ALBUTEROL SULFATE 1 AMPULE: 2.5; .5 SOLUTION RESPIRATORY (INHALATION) at 07:47

## 2022-08-06 RX ADMIN — IPRATROPIUM BROMIDE AND ALBUTEROL SULFATE 1 AMPULE: 2.5; .5 SOLUTION RESPIRATORY (INHALATION) at 20:01

## 2022-08-06 RX ADMIN — DOCUSATE SODIUM 100 MG: 100 CAPSULE, LIQUID FILLED ORAL at 20:17

## 2022-08-06 RX ADMIN — POTASSIUM BICARBONATE 40 MEQ: 782 TABLET, EFFERVESCENT ORAL at 08:22

## 2022-08-06 RX ADMIN — SODIUM CHLORIDE 25 ML: 9 INJECTION, SOLUTION INTRAVENOUS at 03:40

## 2022-08-06 RX ADMIN — PIPERACILLIN AND TAZOBACTAM 3375 MG: 3; .375 INJECTION, POWDER, FOR SOLUTION INTRAVENOUS at 03:41

## 2022-08-06 RX ADMIN — SODIUM CHLORIDE, PRESERVATIVE FREE 10 ML: 5 INJECTION INTRAVENOUS at 08:22

## 2022-08-06 RX ADMIN — POTASSIUM CHLORIDE 10 MEQ: 7.46 INJECTION, SOLUTION INTRAVENOUS at 08:41

## 2022-08-06 RX ADMIN — Medication 40 MG: at 20:17

## 2022-08-06 RX ADMIN — POTASSIUM CHLORIDE 10 MEQ: 7.46 INJECTION, SOLUTION INTRAVENOUS at 09:29

## 2022-08-06 NOTE — PROGRESS NOTES
Hospital Medicine Progress Note     Date:  8/6/2022    PCP: No primary care provider on file. (Tel: None)    Date of Admission: 8/3/2022    Chief complaint:   Chief Complaint   Patient presents with    Shortness of Breath     Pt was picked up outside of [facility] , EMS reports pts confused not really answering, pt looks bad ,        Brief admission history: 28-year-old male with history of dermatitis, tobacco use disorder, marijuana use disorder, occasional alcohol use, who was brought to the emergency room on 8/3/2022 for evaluation of encephalopathy. Patient was reportedly found confused outside of [facility]. Presentation to the emergency room, he has multiple lab and imaging studies that are abnormal, including slightly elevated LFTs, creatinine of 1.4 (baseline unknown), anion gap metabolic acidosis, elevated creatinine kinase of 5440, lactic acid of 2.3, anemia with low MCV. X-ray of right reports unremarkable for acute fracture. CT does chest reveals left upper lung infiltrate and multiple pulmonary nodules (for which follow-up CAT scan is recommended). He has nonzero troponin of 0.05, repeat of 0.04. Urine drug screen was unremarkable. He was admitted for management of bacterial pneumonia, sepsis secondary to pneumonia, anion gap metabolic acidosis, rhabdomyolysis, acute metabolic encephalopathy. Assessment/plan:  Bacterial pneumonia, sepsis secondary to pneumonia. Sepsis much improved. Cultures negative to date. MRSA nares negative. Continue Zyvox and Zosyn. Acute respiratory failure with hypoxia. Continue supplemental oxygen and wean as tolerated. Continue breathing treatments. Treat underlying pneumonic process. Acute metabolic encephalopathy. Secondary to underlying pneumonia. Continue treating infection and monitor for improvement. .    Acute kidney injury, likely prerenal azotemia, resolved with hydration. Anion gap metabolic acidosis, improved.     Rhabdomyolysis, likely nontraumatic, much improved with hydration. Elevated transaminases. Likely secondary to rhabdomyolysis. Checking acute hepatitis profile. Microcytic anemia. Iron studies consistent with iron deficiency anemia. Hemoglobin is stabilized, monitor. Multiple pulmonary nodules noted on CT of the chest.  Patient will benefit from follow-up CT-chest in about 6 months. Electrolyte abnormalities. Continue replacement protocols. Other comorbidities: history of dermatitis, tobacco use disorder, marijuana use disorder, occasional alcohol use. Diet: ADULT DIET; Dysphagia - Minced and Moist    Code status: Full Code   ----------  Subjective  Confused but more awake. Objective  Physical exam:  Vitals: BP (!) 140/76   Pulse 71   Temp 97.8 °F (36.6 °C) (Oral)   Resp 20   Ht 5' 7\" (1.702 m)   Wt 154 lb 5.2 oz (70 kg)   SpO2 96%   PF (!) 3 L/min   BMI 24.17 kg/m²   Gen/overall appearance: Not in acute distress. Confused. More alert this morning. Unkept, poor hygiene. 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 Oral mucosa moist  Neck: No JVD, thyromegaly  CVS: Nml S1S2, no MRG, RRR  Pulm: Diffuse rhonchi. No crackles. Gastrointestinal: Soft, NT/ND, +BS  Musculoskeletal: No edema. Warm  Neuro: No focal deficit. Moves extremity spontaneously. Psychiatry: Appropriate affect. Not agitated. Skin: Warm, dry with normal turgor. No rash  Capillary refill: Brisk,< 3 seconds   Peripheral Pulses: +2 palpable, equal bilaterally     24HR INTAKE/OUTPUT:    Intake/Output Summary (Last 24 hours) at 8/6/2022 1108  Last data filed at 8/5/2022 1524  Gross per 24 hour   Intake 2177.97 ml   Output --   Net 2177.97 ml       I/O last 3 completed shifts: In: 2178 [I.V.:800; IV Piggyback:1378]  Out: -   No intake/output data recorded.   Meds:    potassium phosphate IVPB  10 mmol IntraVENous Once    docusate sodium  100 mg Oral BID    potassium bicarb-citric acid  40 mEq Oral Daily    sodium chloride flush  10 mL IntraVENous 2 times per day    pantoprazole (PROTONIX) 40 mg injection  40 mg IntraVENous Q12H    ferrous sulfate  324 mg Oral Daily with breakfast    ipratropium-albuterol  1 ampule Inhalation Q4H WA    piperacillin-tazobactam  3,375 mg IntraVENous Q8H    linezolid  600 mg IntraVENous Q12H     Infusions:    sodium chloride 25 mL (08/06/22 0340)     PRN Meds: sodium chloride flush, sodium chloride, potassium chloride, magnesium sulfate, promethazine **OR** ondansetron, acetaminophen **OR** acetaminophen, labetalol, albuterol sulfate HFA    Labs/imaging:  CBC:   Recent Labs     08/05/22  0033 08/05/22 0625 08/06/22  0541   WBC  --  5.8 5.3   HGB 8.9* 9.0* 9.9*   PLT  --  136 149       BMP:    Recent Labs     08/05/22 0625 08/06/22  0541   * 136   K 3.3* 3.1*    104   CO2 18* 20*   BUN 15 9   CREATININE 1.0 0.9   GLUCOSE 82 88       Hepatic:   Recent Labs     08/05/22 0625 08/06/22  0541   * 92*   ALT 27 26   BILITOT 0.4 0.4   ALKPHOS 50 54         Chrissie Slater MD  -------------------------------  Eli hospitalist

## 2022-08-06 NOTE — PROGRESS NOTES
Nephrology ([facility]) Progress Note    CC: SIOMARA    Subjective:    HPI:  Breathing comfortably. Confused. Renal function improved. BP better. ROS:  In bed. No fever. [address]:  medications reviewed. Objective:  Blood pressure 118/76, pulse 77, temperature 98.1 °F (36.7 °C), temperature source Axillary, resp. rate 19, height 5' 7\" (1.702 m), weight 154 lb 5.2 oz (70 kg), SpO2 94 %, peak flow (!) 3 L/min. Intake/Output Summary (Last 24 hours) at 8/6/2022 1820  Last data filed at 8/6/2022 1547  Gross per 24 hour   Intake 2027.73 ml   Output --   Net 2027.73 ml       General:  NAD, confused  Chest:  CTAB, poor effort  CVS:  RRR  Abdominal:  NTND, soft, decreased BS  Extremities:  no edema  Skin:  no rash    Labs:  Renal panel:  Lab Results   Component Value Date/Time     08/06/2022 05:41 AM    K 3.1 (L) 08/06/2022 05:41 AM    CO2 20 (L) 08/06/2022 05:41 AM    BUN 9 08/06/2022 05:41 AM    CREATININE 0.9 08/06/2022 05:41 AM    CALCIUM 7.5 (L) 08/06/2022 05:41 AM    PHOS 2.3 (L) 08/06/2022 05:41 AM    MG 1.70 (L) 08/06/2022 05:41 AM     CBC:  Lab Results   Component Value Date/Time    WBC 5.3 08/06/2022 05:41 AM    HGB 9.9 (L) 08/06/2022 05:41 AM    HCT 30.7 (L) 08/06/2022 05:41 AM     08/06/2022 05:41 AM       Assessment/Plan:  Reviewed old records and labs.     1) SIOMARA  - unknown baseline  - resolved  - d/c IVF     2) rhabdomyolysis  - CK improved     3) AMS  - head CT unremarkable  - he could have underlying mental health disease     4) anemia  - iron studies suggestive of mild iron deficiency  - on FeSO4     5) hypoxia  - possible pneumonia  - chest CT shows emphysema  - on empiric zyvox and zosyn     6) FEN  - hyponatremia              - monitor  - hypophosphatemia   - supplement  - hypokalemia   - supplement  - metabolic acidosis   - monitor  - hypomagnesemia   - would suggest PO supplementation going forward if possible as MgSO4 will cause increased urinary losses

## 2022-08-07 ENCOUNTER — APPOINTMENT (OUTPATIENT)
Dept: CT IMAGING | Age: 77
DRG: 871 | End: 2022-08-07
Payer: COMMERCIAL

## 2022-08-07 LAB
A/G RATIO: 0.5 (ref 1.1–2.2)
ALBUMIN SERPL-MCNC: 1.9 G/DL (ref 3.4–5)
ALP BLD-CCNC: 55 U/L (ref 40–129)
ALT SERPL-CCNC: 22 U/L (ref 10–40)
AMMONIA: 30 UMOL/L (ref 16–60)
AMPHETAMINE SCREEN, URINE: NORMAL
ANION GAP SERPL CALCULATED.3IONS-SCNC: 13 MMOL/L (ref 3–16)
AST SERPL-CCNC: 63 U/L (ref 15–37)
BARBITURATE SCREEN URINE: NORMAL
BASOPHILS ABSOLUTE: 0 K/UL (ref 0–0.2)
BASOPHILS RELATIVE PERCENT: 0.4 %
BENZODIAZEPINE SCREEN, URINE: NORMAL
BILIRUB SERPL-MCNC: 0.5 MG/DL (ref 0–1)
BLOOD CULTURE, ROUTINE: NORMAL
BUN BLDV-MCNC: 9 MG/DL (ref 7–20)
CALCIUM SERPL-MCNC: 7.6 MG/DL (ref 8.3–10.6)
CANNABINOID SCREEN URINE: NORMAL
CHLORIDE BLD-SCNC: 98 MMOL/L (ref 99–110)
CO2: 21 MMOL/L (ref 21–32)
COCAINE METABOLITE SCREEN URINE: NORMAL
CREAT SERPL-MCNC: 0.8 MG/DL (ref 0.8–1.3)
CULTURE, BLOOD 2: NORMAL
EOSINOPHILS ABSOLUTE: 0.2 K/UL (ref 0–0.6)
EOSINOPHILS RELATIVE PERCENT: 3.4 %
GFR AFRICAN AMERICAN: >60
GFR NON-AFRICAN AMERICAN: >60
GLUCOSE BLD-MCNC: 167 MG/DL (ref 70–99)
HAV IGM SER IA-ACNC: ABNORMAL
HBV SURFACE AB TITR SER: 3.61 MIU/ML
HCT VFR BLD CALC: 29.1 % (ref 40.5–52.5)
HEMOGLOBIN: 9.5 G/DL (ref 13.5–17.5)
HEPATITIS B CORE IGM ANTIBODY: ABNORMAL
HEPATITIS B CORE IGM ANTIBODY: NORMAL
HEPATITIS B SURFACE ANTIGEN INTERPRETATION: REACTIVE
HEPATITIS C ANTIBODY INTERPRETATION: ABNORMAL
LYMPHOCYTES ABSOLUTE: 1.1 K/UL (ref 1–5.1)
LYMPHOCYTES RELATIVE PERCENT: 18.5 %
Lab: NORMAL
MAGNESIUM: 1.7 MG/DL (ref 1.8–2.4)
MCH RBC QN AUTO: 23.3 PG (ref 26–34)
MCHC RBC AUTO-ENTMCNC: 32.5 G/DL (ref 31–36)
MCV RBC AUTO: 71.7 FL (ref 80–100)
METHADONE SCREEN, URINE: NORMAL
MONOCYTES ABSOLUTE: 0.4 K/UL (ref 0–1.3)
MONOCYTES RELATIVE PERCENT: 6.5 %
NEUTROPHILS ABSOLUTE: 4.4 K/UL (ref 1.7–7.7)
NEUTROPHILS RELATIVE PERCENT: 71.2 %
OPIATE SCREEN URINE: NORMAL
OXYCODONE URINE: NORMAL
PDW BLD-RTO: 20.7 % (ref 12.4–15.4)
PH UA: 8
PHENCYCLIDINE SCREEN URINE: NORMAL
PHOSPHORUS: 2 MG/DL (ref 2.5–4.9)
PLATELET # BLD: 169 K/UL (ref 135–450)
PMV BLD AUTO: 8.1 FL (ref 5–10.5)
POTASSIUM SERPL-SCNC: 3.1 MMOL/L (ref 3.5–5.1)
PROPOXYPHENE SCREEN: NORMAL
RBC # BLD: 4.05 M/UL (ref 4.2–5.9)
SODIUM BLD-SCNC: 132 MMOL/L (ref 136–145)
TOTAL CK: 429 U/L (ref 39–308)
TOTAL PROTEIN: 6 G/DL (ref 6.4–8.2)
TROPONIN: <0.01 NG/ML
WBC # BLD: 6.1 K/UL (ref 4–11)

## 2022-08-07 PROCEDURE — 82550 ASSAY OF CK (CPK): CPT

## 2022-08-07 PROCEDURE — 86705 HEP B CORE ANTIBODY IGM: CPT

## 2022-08-07 PROCEDURE — 6360000002 HC RX W HCPCS: Performed by: INTERNAL MEDICINE

## 2022-08-07 PROCEDURE — 36415 COLL VENOUS BLD VENIPUNCTURE: CPT

## 2022-08-07 PROCEDURE — 82140 ASSAY OF AMMONIA: CPT

## 2022-08-07 PROCEDURE — 86704 HEP B CORE ANTIBODY TOTAL: CPT

## 2022-08-07 PROCEDURE — 83735 ASSAY OF MAGNESIUM: CPT

## 2022-08-07 PROCEDURE — 1200000000 HC SEMI PRIVATE

## 2022-08-07 PROCEDURE — 70450 CT HEAD/BRAIN W/O DYE: CPT

## 2022-08-07 PROCEDURE — 94761 N-INVAS EAR/PLS OXIMETRY MLT: CPT

## 2022-08-07 PROCEDURE — 9990000010 HC NO CHARGE VISIT

## 2022-08-07 PROCEDURE — C9113 INJ PANTOPRAZOLE SODIUM, VIA: HCPCS | Performed by: INTERNAL MEDICINE

## 2022-08-07 PROCEDURE — 84100 ASSAY OF PHOSPHORUS: CPT

## 2022-08-07 PROCEDURE — 80307 DRUG TEST PRSMV CHEM ANLYZR: CPT

## 2022-08-07 PROCEDURE — 86706 HEP B SURFACE ANTIBODY: CPT

## 2022-08-07 PROCEDURE — 2700000000 HC OXYGEN THERAPY PER DAY

## 2022-08-07 PROCEDURE — 80053 COMPREHEN METABOLIC PANEL: CPT

## 2022-08-07 PROCEDURE — 2580000003 HC RX 258: Performed by: INTERNAL MEDICINE

## 2022-08-07 PROCEDURE — 85025 COMPLETE CBC W/AUTO DIFF WBC: CPT

## 2022-08-07 PROCEDURE — 94640 AIRWAY INHALATION TREATMENT: CPT

## 2022-08-07 PROCEDURE — 6370000000 HC RX 637 (ALT 250 FOR IP): Performed by: INTERNAL MEDICINE

## 2022-08-07 PROCEDURE — 2500000003 HC RX 250 WO HCPCS: Performed by: INTERNAL MEDICINE

## 2022-08-07 RX ADMIN — IPRATROPIUM BROMIDE AND ALBUTEROL SULFATE 1 AMPULE: 2.5; .5 SOLUTION RESPIRATORY (INHALATION) at 12:16

## 2022-08-07 RX ADMIN — Medication 40 MG: at 09:20

## 2022-08-07 RX ADMIN — DOCUSATE SODIUM 100 MG: 100 CAPSULE, LIQUID FILLED ORAL at 09:21

## 2022-08-07 RX ADMIN — Medication 40 MG: at 20:46

## 2022-08-07 RX ADMIN — IPRATROPIUM BROMIDE AND ALBUTEROL SULFATE 1 AMPULE: 2.5; .5 SOLUTION RESPIRATORY (INHALATION) at 19:20

## 2022-08-07 RX ADMIN — IPRATROPIUM BROMIDE AND ALBUTEROL SULFATE 1 AMPULE: 2.5; .5 SOLUTION RESPIRATORY (INHALATION) at 16:25

## 2022-08-07 RX ADMIN — LINEZOLID 600 MG: 600 INJECTION, SOLUTION INTRAVENOUS at 09:27

## 2022-08-07 RX ADMIN — PIPERACILLIN AND TAZOBACTAM 3375 MG: 3; .375 INJECTION, POWDER, FOR SOLUTION INTRAVENOUS at 03:26

## 2022-08-07 RX ADMIN — IPRATROPIUM BROMIDE AND ALBUTEROL SULFATE 1 AMPULE: 2.5; .5 SOLUTION RESPIRATORY (INHALATION) at 08:06

## 2022-08-07 RX ADMIN — DOCUSATE SODIUM 100 MG: 100 CAPSULE, LIQUID FILLED ORAL at 20:46

## 2022-08-07 RX ADMIN — POTASSIUM BICARBONATE 40 MEQ: 782 TABLET, EFFERVESCENT ORAL at 09:21

## 2022-08-07 RX ADMIN — POTASSIUM PHOSPHATE, MONOBASIC AND POTASSIUM PHOSPHATE, DIBASIC 15 MMOL: 224; 236 INJECTION, SOLUTION INTRAVENOUS at 17:36

## 2022-08-07 RX ADMIN — FERROUS SULFATE TAB EC 324 MG (65 MG FE EQUIVALENT) 324 MG: 324 (65 FE) TABLET DELAYED RESPONSE at 09:21

## 2022-08-07 RX ADMIN — SODIUM CHLORIDE 25 ML: 9 INJECTION, SOLUTION INTRAVENOUS at 03:24

## 2022-08-07 RX ADMIN — PIPERACILLIN AND TAZOBACTAM 3375 MG: 3; .375 INJECTION, POWDER, FOR SOLUTION INTRAVENOUS at 12:23

## 2022-08-07 RX ADMIN — PIPERACILLIN AND TAZOBACTAM 3375 MG: 3; .375 INJECTION, POWDER, FOR SOLUTION INTRAVENOUS at 18:38

## 2022-08-07 NOTE — PROGRESS NOTES
Nephrology ([facility]) Progress Note    CC: SIOMARA    Subjective:    HPI:  Breathing comfortably. Confused. Renal function improved. BP better. ROS:  In bed. No fever. [address]:  medications reviewed. Objective:  Blood pressure 120/70, pulse 70, temperature 97.8 °F (36.6 °C), temperature source Oral, resp. rate 22, height 5' 7\" (1.702 m), weight 148 lb 5.9 oz (67.3 kg), SpO2 96 %, peak flow (!) 3 L/min. Intake/Output Summary (Last 24 hours) at 8/7/2022 1616  Last data filed at 8/7/2022 1612  Gross per 24 hour   Intake 1416.82 ml   Output 100 ml   Net 1316.82 ml       General:  NAD, confused  Chest:  CTAB, poor effort  CVS:  RRR  Abdominal:  NTND, soft, decreased BS  Extremities:  no edema  Skin:  no rash    Labs:  Renal panel:  Lab Results   Component Value Date/Time     (L) 08/07/2022 06:28 AM    K 3.1 (L) 08/07/2022 06:28 AM    CO2 21 08/07/2022 06:28 AM    BUN 9 08/07/2022 06:28 AM    CREATININE 0.8 08/07/2022 06:28 AM    CALCIUM 7.6 (L) 08/07/2022 06:28 AM    PHOS 2.0 (L) 08/07/2022 06:28 AM    MG 1.70 (L) 08/07/2022 06:28 AM     CBC:  Lab Results   Component Value Date/Time    WBC 6.1 08/07/2022 06:28 AM    HGB 9.5 (L) 08/07/2022 06:28 AM    HCT 29.1 (L) 08/07/2022 06:28 AM     08/07/2022 06:28 AM       Assessment/Plan:  Reviewed old records and labs.     1) SIOMARA  - unknown baseline  - resolved     2) rhabdomyolysis  - CK improved     3) AMS  - head CT unremarkable  - he could have underlying mental health disease     4) anemia  - iron studies suggestive of mild iron deficiency  - on FeSO4     5) hypoxia  - possible pneumonia  - chest CT shows emphysema  - on empiric zyvox and zosyn     6) FEN  - hyponatremia              - monitor  - hypophosphatemia   - supplement  - hypokalemia   - supplement  - metabolic acidosis   - monitor  - hypomagnesemia   - would suggest PO supplementation going forward if possible as MgSO4 will cause increased urinary losses

## 2022-08-07 NOTE — PROGRESS NOTES
Physical Therapy  PT referral received and chart reviewed. Pt declined PT for oob activities due to general fatigue at this time. Pt is [place] and difficult to engage at this time. Will follow-up tomorrow.   Tevin Mendez, PT

## 2022-08-07 NOTE — PLAN OF CARE
Problem: Discharge Planning  Goal: Discharge to home or other facility with appropriate resources  Outcome: Progressing     Problem: Safety - Adult  Goal: Free from fall injury  Outcome: Progressing     Problem: Skin/Tissue Integrity  Goal: Absence of new skin breakdown  Description: 1. Monitor for areas of redness and/or skin breakdown  2. Assess vascular access sites hourly  3. Every 4-6 hours minimum:  Change oxygen saturation probe site  4. Every 4-6 hours:  If on nasal continuous positive airway pressure, respiratory therapy assess nares and determine need for appliance change or resting period. Outcome: Progressing     Problem: Confusion  Goal: Confusion, delirium, dementia, or psychosis is improved or at baseline  Description: INTERVENTIONS:  1. Assess for possible contributors to thought disturbance, including medications, impaired vision or hearing, underlying metabolic abnormalities, dehydration, psychiatric diagnoses, and notify attending LIP  2. [place] high risk fall precautions, as indicated  3. Provide frequent short contacts to provide reality reorientation, refocusing and direction  4. Decrease environmental stimuli, including noise as appropriate  5. Monitor and intervene to maintain adequate nutrition, hydration, elimination, sleep and activity  6. If unable to ensure safety without constant attention obtain sitter and review sitter guidelines with assigned personnel  7.  Initiate Psychosocial CNS and Spiritual Care consult, as indicated  Outcome: Progressing     Problem: Pain  Goal: Verbalizes/displays adequate comfort level or baseline comfort level  Outcome: Progressing     Problem: Nutrition Deficit:  Goal: Optimize nutritional status  Outcome: Progressing     Problem: Musculoskeletal - Adult  Goal: Return mobility to safest level of function  Outcome: Progressing     Problem: Infection - Adult  Goal: Absence of infection at discharge  Outcome: Progressing

## 2022-08-07 NOTE — PROGRESS NOTES
Hospital Medicine Progress Note     Date:  8/7/2022    PCP: No primary care provider on file. (Tel: None)    Date of Admission: 8/3/2022    Chief complaint:   Chief Complaint   Patient presents with    Shortness of Breath     Pt was picked up outside of [facility] , EMS reports pts confused not really answering, pt looks bad ,        Brief admission history: 80-year-old male with history of dermatitis, tobacco use disorder, marijuana use disorder, occasional alcohol use, who was brought to the emergency room on 8/3/2022 for evaluation of encephalopathy. Patient was reportedly found confused outside of [facility]. Presentation to the emergency room, he has multiple lab and imaging studies that are abnormal, including slightly elevated LFTs, creatinine of 1.4 (baseline unknown), anion gap metabolic acidosis, elevated creatinine kinase of 5440, lactic acid of 2.3, anemia with low MCV. X-ray of right reports unremarkable for acute fracture. CT does chest reveals left upper lung infiltrate and multiple pulmonary nodules (for which follow-up CAT scan is recommended). He has nonzero troponin of 0.05, repeat of 0.04. Urine drug screen was unremarkable. He was admitted for management of bacterial pneumonia, sepsis secondary to pneumonia, anion gap metabolic acidosis, rhabdomyolysis, acute metabolic encephalopathy. Assessment/plan:  Bacterial pneumonia, sepsis secondary to pneumonia. Sepsis much improved. Cultures negative to date. MRSA nares negative. Discontinued Zyvox on 8/7/2022 (received from 8/4/2022 to 8/7/2022). Continue Zosyn. Acute respiratory failure with hypoxia. Continue supplemental oxygen and wean as tolerated. Continue breathing treatments. Treat underlying pneumonic process. Acute metabolic encephalopathy. Secondary to underlying pneumonia. Continues to drift in and out of somnolence. Will check CT-head, urine drug screen and ammonia level.   Acute kidney injury, likely prerenal azotemia, resolved with hydration. Anion gap metabolic acidosis, improved. Rhabdomyolysis, likely nontraumatic, much improved with hydration. Elevated transaminases. Likely secondary to rhabdomyolysis. Acute hepatitis profile is positive for hepatitis B surface antibody; will check hepatitis B core antibody IgM, hepatitis B total core antibody, hepatitis B surface antibody. Microcytic anemia. Iron studies consistent with iron deficiency anemia. Hemoglobin is stabilized, monitor. Multiple pulmonary nodules noted on CT of the chest.  Patient will benefit from follow-up CT-chest in about 6 months. Electrolyte abnormalities. Continue replacement protocols. Other comorbidities: history of dermatitis, tobacco use disorder, marijuana use disorder, occasional alcohol use. Diet: ADULT DIET; Dysphagia - Minced and Moist    Code status: Full Code   ----------  Subjective  Confused still. In and out of drowsiness. Objective  Physical exam:  Vitals: BP (!) 149/69   Pulse 68   Temp 98 °F (36.7 °C) (Oral)   Resp 22   Ht 5' 7\" (1.702 m)   Wt 148 lb 5.9 oz (67.3 kg)   SpO2 95%   PF (!) 3 L/min   BMI 23.24 kg/m²   Gen/overall appearance: Not in acute distress. Confused. More alert this morning. Unkept, poor hygiene. 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 Oral mucosa moist  Neck: No JVD, thyromegaly  CVS: Nml S1S2, no MRG, RRR  Pulm: Diffuse rhonchi. No crackles. Gastrointestinal: Soft, NT/ND, +BS  Musculoskeletal: No edema. Warm  Neuro: No focal deficit. Moves extremity spontaneously. Psychiatry: Appropriate affect. Not agitated. Skin: Warm, dry with normal turgor.  No rash  Capillary refill: Brisk,< 3 seconds   Peripheral Pulses: +2 palpable, equal bilaterally     24HR INTAKE/OUTPUT:    Intake/Output Summary (Last 24 hours) at 8/7/2022 0907  Last data filed at 8/7/2022 7911  Gross per 24 hour   Intake 3444.55 ml Output --   Net 3444.55 ml       I/O last 3 completed shifts: In: 3444.6 [P.O.:300; I.V.:1352.2; IV Piggyback:1792.3]  Out: -   No intake/output data recorded.   Meds:    docusate sodium  100 mg Oral BID    potassium bicarb-citric acid  40 mEq Oral Daily    sodium chloride flush  10 mL IntraVENous 2 times per day    pantoprazole (PROTONIX) 40 mg injection  40 mg IntraVENous Q12H    ferrous sulfate  324 mg Oral Daily with breakfast    ipratropium-albuterol  1 ampule Inhalation Q4H WA    piperacillin-tazobactam  3,375 mg IntraVENous Q8H     Infusions:    sodium chloride Stopped (08/07/22 0324)     PRN Meds: sodium chloride flush, sodium chloride, potassium chloride, magnesium sulfate, promethazine **OR** ondansetron, acetaminophen **OR** acetaminophen, labetalol, albuterol sulfate HFA    Labs/imaging:  CBC:   Recent Labs     08/05/22 0625 08/06/22  0541 08/07/22  0628   WBC 5.8 5.3 6.1   HGB 9.0* 9.9* 9.5*    149 169       BMP:    Recent Labs     08/05/22 0625 08/06/22  0541 08/07/22  0628   * 136 132*   K 3.3* 3.1* 3.1*    104 98*   CO2 18* 20* 21   BUN 15 9 9   CREATININE 1.0 0.9 0.8   GLUCOSE 82 88 167*       Hepatic:   Recent Labs     08/05/22 0625 08/06/22  0541 08/07/22  0628   * 92* 63*   ALT 27 26 22   BILITOT 0.4 0.4 0.5   ALKPHOS 50 54 55         Praful Mobley MD  -------------------------------  Rounding hospitalist

## 2022-08-08 LAB
A/G RATIO: 0.5 (ref 1.1–2.2)
ALBUMIN SERPL-MCNC: 2.2 G/DL (ref 3.4–5)
ALP BLD-CCNC: 56 U/L (ref 40–129)
ALT SERPL-CCNC: 19 U/L (ref 10–40)
ANION GAP SERPL CALCULATED.3IONS-SCNC: 13 MMOL/L (ref 3–16)
AST SERPL-CCNC: 46 U/L (ref 15–37)
BASOPHILS ABSOLUTE: 0 K/UL (ref 0–0.2)
BASOPHILS RELATIVE PERCENT: 0.7 %
BILIRUB SERPL-MCNC: 0.6 MG/DL (ref 0–1)
BUN BLDV-MCNC: 7 MG/DL (ref 7–20)
CALCIUM SERPL-MCNC: 8.3 MG/DL (ref 8.3–10.6)
CHLORIDE BLD-SCNC: 102 MMOL/L (ref 99–110)
CO2: 21 MMOL/L (ref 21–32)
CREAT SERPL-MCNC: 0.9 MG/DL (ref 0.8–1.3)
EOSINOPHILS ABSOLUTE: 0.2 K/UL (ref 0–0.6)
EOSINOPHILS RELATIVE PERCENT: 4 %
GFR AFRICAN AMERICAN: >60
GFR NON-AFRICAN AMERICAN: >60
GLUCOSE BLD-MCNC: 97 MG/DL (ref 70–99)
HCT VFR BLD CALC: 31.1 % (ref 40.5–52.5)
HEMOGLOBIN: 10.2 G/DL (ref 13.5–17.5)
HEPATITIS B CORE TOTAL ANTIBODY: POSITIVE
HEPATITIS B SURFACE ANTIGEN CONFIRMATION: POSITIVE
LYMPHOCYTES ABSOLUTE: 1.3 K/UL (ref 1–5.1)
LYMPHOCYTES RELATIVE PERCENT: 23.5 %
MAGNESIUM: 1.8 MG/DL (ref 1.8–2.4)
MCH RBC QN AUTO: 23.2 PG (ref 26–34)
MCHC RBC AUTO-ENTMCNC: 32.6 G/DL (ref 31–36)
MCV RBC AUTO: 71.2 FL (ref 80–100)
MONOCYTES ABSOLUTE: 0.3 K/UL (ref 0–1.3)
MONOCYTES RELATIVE PERCENT: 5.3 %
NEUTROPHILS ABSOLUTE: 3.8 K/UL (ref 1.7–7.7)
NEUTROPHILS RELATIVE PERCENT: 66.5 %
PDW BLD-RTO: 20.2 % (ref 12.4–15.4)
PHOSPHORUS: 2.6 MG/DL (ref 2.5–4.9)
PLATELET # BLD: 210 K/UL (ref 135–450)
PMV BLD AUTO: 7.7 FL (ref 5–10.5)
POTASSIUM SERPL-SCNC: 3.2 MMOL/L (ref 3.5–5.1)
RBC # BLD: 4.37 M/UL (ref 4.2–5.9)
SODIUM BLD-SCNC: 136 MMOL/L (ref 136–145)
TOTAL CK: 267 U/L (ref 39–308)
TOTAL PROTEIN: 6.4 G/DL (ref 6.4–8.2)
WBC # BLD: 5.7 K/UL (ref 4–11)

## 2022-08-08 PROCEDURE — 97530 THERAPEUTIC ACTIVITIES: CPT

## 2022-08-08 PROCEDURE — 94680 O2 UPTK RST&XERS DIR SIMPLE: CPT

## 2022-08-08 PROCEDURE — 36415 COLL VENOUS BLD VENIPUNCTURE: CPT

## 2022-08-08 PROCEDURE — 6370000000 HC RX 637 (ALT 250 FOR IP): Performed by: INTERNAL MEDICINE

## 2022-08-08 PROCEDURE — 97116 GAIT TRAINING THERAPY: CPT

## 2022-08-08 PROCEDURE — 84100 ASSAY OF PHOSPHORUS: CPT

## 2022-08-08 PROCEDURE — 97535 SELF CARE MNGMENT TRAINING: CPT

## 2022-08-08 PROCEDURE — 97162 PT EVAL MOD COMPLEX 30 MIN: CPT

## 2022-08-08 PROCEDURE — 6360000002 HC RX W HCPCS: Performed by: INTERNAL MEDICINE

## 2022-08-08 PROCEDURE — 1200000000 HC SEMI PRIVATE

## 2022-08-08 PROCEDURE — C9113 INJ PANTOPRAZOLE SODIUM, VIA: HCPCS | Performed by: INTERNAL MEDICINE

## 2022-08-08 PROCEDURE — 97166 OT EVAL MOD COMPLEX 45 MIN: CPT

## 2022-08-08 PROCEDURE — 94640 AIRWAY INHALATION TREATMENT: CPT

## 2022-08-08 PROCEDURE — 2580000003 HC RX 258: Performed by: INTERNAL MEDICINE

## 2022-08-08 PROCEDURE — 82550 ASSAY OF CK (CPK): CPT

## 2022-08-08 PROCEDURE — 94761 N-INVAS EAR/PLS OXIMETRY MLT: CPT

## 2022-08-08 PROCEDURE — 80053 COMPREHEN METABOLIC PANEL: CPT

## 2022-08-08 PROCEDURE — 2700000000 HC OXYGEN THERAPY PER DAY

## 2022-08-08 PROCEDURE — 83735 ASSAY OF MAGNESIUM: CPT

## 2022-08-08 PROCEDURE — 85025 COMPLETE CBC W/AUTO DIFF WBC: CPT

## 2022-08-08 RX ORDER — AMOXICILLIN AND CLAVULANATE POTASSIUM 875; 125 MG/1; MG/1
1 TABLET, FILM COATED ORAL EVERY 12 HOURS SCHEDULED
Status: DISCONTINUED | OUTPATIENT
Start: 2022-08-08 | End: 2022-08-09

## 2022-08-08 RX ORDER — FERROUS SULFATE TAB EC 324 MG (65 MG FE EQUIVALENT) 324 (65 FE) MG
324 TABLET DELAYED RESPONSE ORAL
Qty: 30 TABLET | Refills: 0 | Status: SHIPPED | OUTPATIENT
Start: 2022-08-09

## 2022-08-08 RX ORDER — PSEUDOEPHEDRINE HCL 30 MG
100 TABLET ORAL 2 TIMES DAILY PRN
Qty: 60 CAPSULE | Refills: 0 | Status: SHIPPED | OUTPATIENT
Start: 2022-08-08

## 2022-08-08 RX ORDER — LACTOBACILLUS RHAMNOSUS GG 10B CELL
1 CAPSULE ORAL 2 TIMES DAILY WITH MEALS
Status: DISCONTINUED | OUTPATIENT
Start: 2022-08-08 | End: 2022-08-10 | Stop reason: HOSPADM

## 2022-08-08 RX ADMIN — AMOXICILLIN AND CLAVULANATE POTASSIUM 1 TABLET: 875; 125 TABLET, FILM COATED ORAL at 23:46

## 2022-08-08 RX ADMIN — DOCUSATE SODIUM 100 MG: 100 CAPSULE, LIQUID FILLED ORAL at 08:02

## 2022-08-08 RX ADMIN — PIPERACILLIN AND TAZOBACTAM 3375 MG: 3; .375 INJECTION, POWDER, FOR SOLUTION INTRAVENOUS at 03:02

## 2022-08-08 RX ADMIN — FERROUS SULFATE TAB EC 324 MG (65 MG FE EQUIVALENT) 324 MG: 324 (65 FE) TABLET DELAYED RESPONSE at 08:02

## 2022-08-08 RX ADMIN — IPRATROPIUM BROMIDE AND ALBUTEROL SULFATE 1 AMPULE: 2.5; .5 SOLUTION RESPIRATORY (INHALATION) at 11:59

## 2022-08-08 RX ADMIN — POTASSIUM BICARBONATE 40 MEQ: 782 TABLET, EFFERVESCENT ORAL at 08:02

## 2022-08-08 RX ADMIN — DOCUSATE SODIUM 100 MG: 100 CAPSULE, LIQUID FILLED ORAL at 23:46

## 2022-08-08 RX ADMIN — Medication 1 CAPSULE: at 17:29

## 2022-08-08 RX ADMIN — Medication 40 MG: at 23:46

## 2022-08-08 RX ADMIN — IPRATROPIUM BROMIDE AND ALBUTEROL SULFATE 1 AMPULE: 2.5; .5 SOLUTION RESPIRATORY (INHALATION) at 19:46

## 2022-08-08 RX ADMIN — SODIUM CHLORIDE, PRESERVATIVE FREE 10 ML: 5 INJECTION INTRAVENOUS at 08:03

## 2022-08-08 RX ADMIN — IPRATROPIUM BROMIDE AND ALBUTEROL SULFATE 1 AMPULE: 2.5; .5 SOLUTION RESPIRATORY (INHALATION) at 08:08

## 2022-08-08 RX ADMIN — IPRATROPIUM BROMIDE AND ALBUTEROL SULFATE 1 AMPULE: 2.5; .5 SOLUTION RESPIRATORY (INHALATION) at 16:03

## 2022-08-08 RX ADMIN — Medication 40 MG: at 08:02

## 2022-08-08 NOTE — PROGRESS NOTES
Physical Therapy  Facility/Department: [facility] 5N PROGRESSIVE CARE  Physical Therapy Initial Assessment    Name: Cristina Barnhart  : 1945  MRN: [id number]  Date of Service: 2022    Discharge Recommendations:  Patient would benefit from continued therapy after discharge (3-5x/wk)   PT Equipment Recommendations  Equipment Needed: No  Other: defer to facility    Cristina Barnhart scored a 15/24 on the AM-PAC short mobility form. Current research shows that an AM-PAC score of 17 or less is typically not associated with a discharge to the patient's home setting. Based on the patient's AM-PAC score and their current functional mobility deficits, it is recommended that the patient have 3-5 sessions per week of Physical Therapy at d/c to increase the patient's independence. Please see assessment section for further patient specific details. If patient discharges prior to next session this note will serve as a discharge summary. Please see below for the latest assessment towards goals. Patient Diagnosis(es): The primary encounter diagnosis was Pneumonia due to infectious organism, unspecified laterality, unspecified part of lung. Diagnoses of Hypoxia, Renal insufficiency, and Severe sepsis ([address] were also pertinent to this visit. Past Medical History:  has a past medical history of CHF (congestive heart failure) ([address] Past Surgical History:  has no past surgical history on file. Assessment   Body Structures, Functions, Activity Limitations Requiring Skilled Therapeutic Intervention: Decreased functional mobility ; Decreased strength;Decreased safe awareness;Decreased balance  Assessment: Pt is a 78 y/o male who presents with pneumonia. Pt recently homeless with wife. Pt currently requires min A for bed mobility and transfers and mod A for ambulation without device. Pt would benefit from continued skilled PT to address these limitations. Pt unsafe to return to prior living arrangements.  Recommend continued therapy 3-5x/wk at discharge. Treatment Diagnosis: impaired functional mobility  Therapy Prognosis: Good  Decision Making: Medium Complexity  Requires PT Follow-Up: Yes  Activity Tolerance  Activity Tolerance: Patient tolerated evaluation without incident  Activity Tolerance Comments: SpO2 91-94% throughout session     Plan   Plan  Plan: 3-5 times per week  Current Treatment Recommendations: Strengthening, Balance training, Functional mobility training, Transfer training, Gait training, Patient/Caregiver education & training, Therapeutic activities  Safety Devices  Type of Devices: All fall risk precautions in place, Call light within reach, Chair alarm in place, Gait belt, Left in chair, Patient at risk for falls     Restrictions  Restrictions/Precautions  Restrictions/Precautions: Fall Risk     Subjective   General  Chart Reviewed: Yes  Patient assessed for rehabilitation services?: Yes  Additional Pertinent Hx: Per H&P, \"66 y.o. male who presented to [facility] with past medical history of HF resented ED with chief complaint of shortness of breath. History limited from patient as patient is encephalopathic. Per report patient was picked up from outside [employer] was confused answering,. Patient was noted to be hypoxic thus was transferred here for further evaluation\"  Family / Caregiver Present: Yes (wife sleeping on couch)  Referring Practitioner: Chrissie Slater MD  Referral Date : 08/06/22  Diagnosis: pneumonia  Subjective  Subjective: Pt agreeable to evaluation. Denies pain. Social/Functional History  Social/Functional History  Lives With: Significant other  Type of Home: Homeless  Has the patient had two or more falls in the past year or any fall with injury in the past year?: Yes (no fractures per imaging, multiple bruises)  Additional Comments: Per chart, recently lost apartment and was living in car. Car now not working so may have been sleeping outside.   Wife present in room but did not engage with therapists. Documentation also alludes to possible concerns over wife's ability to make decisions  Vision/Hearing  Hearing  Hearing: Exceptions to [facility]  Hearing Exceptions: Hard of hearing/hearing concerns      Objective     Gross Assessment  AROM: Within functional limits  Strength: Generally decreased, functional     Bed Mobility Training  Bed Mobility Training: Yes  Balance  Sitting: Intact  Standing: Impaired  Standing - Static: Fair  Standing - Dynamic: Poor  Transfer Training  Transfer Training: Yes  Overall Level of Assistance: Minimum assistance  Sit to Stand: Minimum assistance  Stand to Sit: Minimum assistance  Bed to Chair: Minimum assistance  Gait Training  Gait Training: Yes  Gait  Overall Level of Assistance: Moderate assistance;Minimum assistance (HHA, no device used)  Base of Support: Widened  Speed/Mer: Shuffled; Slow  Step Length: Left shortened;Right shortened  Gait Abnormalities: Decreased step clearance; Path deviations (one loss of balance requiring mod A to correct; unsteady throughout)  Distance (ft): 25 Feet  Assistive Device:  (no device)    AM-PAC Score  AM-PAC Inpatient Mobility Raw Score : 15 (08/08/22 1449)  AM-PAC Inpatient T-Scale Score : 39.45 (08/08/22 1449)  Mobility Inpatient CMS 0-100% Score: 57.7 (08/08/22 1449)  Mobility Inpatient CMS G-Code Modifier : CK (08/08/22 1449)        Goals  Short Term Goals  Time Frame for Short term goals: until d/c  Short term goal 1: Pt will perform bed mobility with supervision  Short term goal 2: Pt will perform transfers with SBA  Short term goal 3: Pt will ambulate 100' without appropriate AD and CGA  Patient Goals   Patient goals : unable to state goal     Education  Patient Education  Education Given To: Patient  Education Provided: Role of Therapy;Plan of Care;Orientation  Education Provided Comments: Safety with mobility, use of call light and calling to ask for assistance  Education Method: Demonstration;Verbal  Barriers

## 2022-08-08 NOTE — PLAN OF CARE
Problem: Discharge Planning  Goal: Discharge to home or other facility with appropriate resources  8/8/2022 0351 by Anibal Morgan RN  Outcome: Progressing     Problem: Safety - Adult  Goal: Free from fall injury  8/8/2022 0351 by Anibal Morgan RN  Outcome: Progressing     Problem: Skin/Tissue Integrity  Goal: Absence of new skin breakdown  Description: 1. Monitor for areas of redness and/or skin breakdown  2. Assess vascular access sites hourly  3. Every 4-6 hours minimum:  Change oxygen saturation probe site  4. Every 4-6 hours:  If on nasal continuous positive airway pressure, respiratory therapy assess nares and determine need for appliance change or resting period. 8/8/2022 0351 by Anibal Morgan RN  Outcome: Progressing     Problem: Confusion  Goal: Confusion, delirium, dementia, or psychosis is improved or at baseline  Description: INTERVENTIONS:  1. Assess for possible contributors to thought disturbance, including medications, impaired vision or hearing, underlying metabolic abnormalities, dehydration, psychiatric diagnoses, and notify attending LIP  2. [place] high risk fall precautions, as indicated  3. Provide frequent short contacts to provide reality reorientation, refocusing and direction  4. Decrease environmental stimuli, including noise as appropriate  5. Monitor and intervene to maintain adequate nutrition, hydration, elimination, sleep and activity  6. If unable to ensure safety without constant attention obtain sitter and review sitter guidelines with assigned personnel  7.  Initiate Psychosocial CNS and Spiritual Care consult, as indicated  8/8/2022 0351 by Aniabl Morgan RN  Outcome: Progressing

## 2022-08-08 NOTE — PROGRESS NOTES
Nephrology ([facility]) Progress Note    CC: SIOMARA    Subjective:    HPI:  Breathing comfortably. Confused. Renal function improved. BP better. ROS:  In bed. No fever. [address]:  medications reviewed. docusate sodium  100 mg Oral BID    potassium bicarb-citric acid  40 mEq Oral Daily    sodium chloride flush  10 mL IntraVENous 2 times per day    pantoprazole (PROTONIX) 40 mg injection  40 mg IntraVENous Q12H    ferrous sulfate  324 mg Oral Daily with breakfast    ipratropium-albuterol  1 ampule Inhalation Q4H WA    piperacillin-tazobactam  3,375 mg IntraVENous Q8H         Objective:  Blood pressure 139/78, pulse 67, temperature 98.3 °F (36.8 °C), temperature source Oral, resp. rate 17, height 5' 7\" (1.702 m), weight 148 lb 13 oz (67.5 kg), SpO2 94 %, peak flow (!) 3 L/min. Intake/Output Summary (Last 24 hours) at 8/8/2022 1137  Last data filed at 8/8/2022 0814  Gross per 24 hour   Intake 878.09 ml   Output 900 ml   Net -21.91 ml       General:  NAD, confused  Chest:  CTAB, poor effort  CVS:  RRR  Abdominal:  NTND, soft, decreased BS  Extremities:  no edema  Skin:  no rash    Labs:  Renal panel:  Lab Results   Component Value Date/Time     08/08/2022 06:04 AM    K 3.2 (L) 08/08/2022 06:04 AM    CO2 21 08/08/2022 06:04 AM    BUN 7 08/08/2022 06:04 AM    CREATININE 0.9 08/08/2022 06:04 AM    CALCIUM 8.3 08/08/2022 06:04 AM    PHOS 2.6 08/08/2022 06:04 AM    MG 1.80 08/08/2022 06:04 AM     CBC:  Lab Results   Component Value Date/Time    WBC 5.7 08/08/2022 06:04 AM    HGB 10.2 (L) 08/08/2022 06:04 AM    HCT 31.1 (L) 08/08/2022 06:04 AM     08/08/2022 06:04 AM       Assessment/Plan:  Reviewed old records and labs.     1) SIOMARA  - unknown baseline  - resolved     2) rhabdomyolysis  - CK improved     3) AMS  - head CT unremarkable  - he could have underlying mental health disease     4) anemia  - iron studies suggestive of mild iron deficiency  - on FeSO4     5) hypoxia  - possible pneumonia  - chest CT shows emphysema  - on empiric zosyn     6) hypokalemia   - supplement    Nephrology will sign off

## 2022-08-08 NOTE — PROGRESS NOTES
Occupational Therapy  Facility/Department: [address] PROGRESSIVE CARE  Occupational Therapy Initial Assessment  Should patient be discharged prior to another treatment session, this note shall serve as the discharge summary. Name: Mavis Mcpherson  : 1945  MRN: [id number]  Date of Service: 2022    Discharge Recommendations:  3-5 sessions per week, Patient would benefit from continued therapy after discharge          Patient Diagnosis(es): The primary encounter diagnosis was Pneumonia due to infectious organism, unspecified laterality, unspecified part of lung. Diagnoses of Hypoxia, Renal insufficiency, and Severe sepsis ([facility] were also pertinent to this visit. Past Medical History:  has a past medical history of CHF (congestive heart failure) ([facility] Past Surgical History:  has no past surgical history on file. Assessment   Performance deficits / Impairments: Decreased functional mobility ; Decreased high-level IADLs;Decreased ADL status; Decreased endurance;Decreased strength;Decreased balance;Decreased safe awareness;Decreased cognition  Assessment: PResents after being found down by EMS in front of [facility]. Concerns for multiple falls and PNA. Pt and his wife are homeless and have concerns about their ability to competently make decisions. Chart indicates they are working with the [employer] which provides mental health and housing assistance but they potentially were sleeping outside. Pt required min A with bed mobility, Min A for transfers and up to min/mod A with functional mobility with multiple LOB.   Pt would benefit from cont OT to increase his independence with self care and functional mobility, 3-5x week  Prognosis: Good;Fair  Decision Making: Medium Complexity  History: see above  Exam: mobility, self care  Assistance / Modification: assist of 1  REQUIRES OT FOLLOW-UP: Yes  Activity Tolerance  Activity Tolerance: Patient Tolerated treatment well;Patient limited by fatigue Plan   Plan  Times per Week: 3-5  Times per Day: Daily  Plan Weeks: 1  Current Treatment Recommendations: Strengthening, Balance training, Functional mobility training, Endurance training, Patient/Caregiver education & training, Safety education & training, Equipment evaluation, education, & procurement, Home management training, Self-Care / ADL     Restrictions  Restrictions/Precautions  Restrictions/Precautions: Fall Risk    Subjective   General  Chart Reviewed: Yes, Orders, Progress Notes, History and Physical  Patient assessed for rehabilitation services?: Yes  Additional Pertinent Hx: Per Dr. Man Lombardi, \"66 y.o. male who presented to [facility] with past medical history of HF resented ED with chief complaint of shortness of breath. History limited from patient as patient is encephalopathic. Per report patient was picked up from outside [facility] was confused answering,. Patient was noted to be hypoxic thus was transferred here for further evaluation\"  Family / Caregiver Present: No  Referring Practitioner: Dr. Griffith   Diagnosis: PNA  Subjective  Subjective: Agreed to OT/PT, no complaints     Social/Functional History  Social/Functional History  Lives With: Significant other  Type of Home: Homeless  Has the patient had two or more falls in the past year or any fall with injury in the past year?: Yes (no fractures per imaging, multiple bruises)  Additional Comments: Per chart, recently lost apartment and was living in car. Car now not working so may have been sleeping outside. Wife present in room but did not engage with therapists.   Documentation also alludes to possible concerns over wife's ability to make decisions       Objective   Heart Rate: 76  Heart Rate Source: Telemetry  BP: (!) 153/69  BP Location: Right Arm  BP Method: Automatic  MAP (Calculated): 97  Resp: 28  SpO2: (!) 87 %  O2 Device: Nasal cannula          Observation/Palpation  Observation: malnurished, meeting teeth  Safety Devices  Type of Devices: All fall risk precautions in place;Call light within reach; Chair alarm in place;Gait belt;Left in chair;Patient at risk for falls  Bed Mobility Training  Bed Mobility Training: Yes  Balance  Sitting: Intact  Standing: Impaired  Standing - Static: Fair  Standing - Dynamic: Poor  Transfer Training  Transfer Training: Yes  Overall Level of Assistance: Minimum assistance  Sit to Stand: Minimum assistance  Stand to Sit: Minimum assistance  Bed to Chair: Minimum assistance  Gait Training  Gait Training: Yes  Gait  Overall Level of Assistance: Moderate assistance;Minimum assistance (HHA, no device used)  Base of Support: Widened  Speed/Mer: Shuffled; Slow  Step Length: Left shortened;Right shortened  Gait Abnormalities: Decreased step clearance; Path deviations (one loss of balance requiring mod A to correct; unsteady throughout)  Distance (ft): 25 Feet  Assistive Device:  (no device)     AROM: Within functional limits  PROM: Within functional limits  Strength: Within functional limits  Coordination: Within functional limits  Tone: Normal  Sensation: Intact  ADL  LE Dressing Skilled Clinical Factors: OT donned socks, pt was resistant at first thinking it would not fit  Toileting:  (with purewick but no urine in container. Pt states he can call RN before he needs to go.)  Additional Comments: Anticipate min to mod A for LB bathing and dressing based on observed balance and initiation at this time. Cont to assess     Activity Tolerance  Activity Tolerance: Patient tolerated evaluation without incident  Activity Tolerance Comments: SpO2 91-94% throughout session  Bed mobility  Supine to Sit: Minimal assistance (HOB elevated)  Sit to Supine: Unable to assess (Left sitting in recliner at end of session)  Transfers  Sit to stand: Minimal assistance  Stand to sit: Minimal assistance  Transfer Comments: Cues for safety and initiation.   Pt was unsteady while ambulating required min to mod A for balance. Vision  Vision: Within Functional Limits (appears [facility] for purposes of eval)  Hearing  Hearing: Exceptions to [facility])  Hearing Exceptions: Hard of hearing/hearing concerns  Cognition  Overall Cognitive Status: Exceptions  Arousal/Alertness: Delayed responses to stimuli  Following Commands: Follows one step commands consistently  Attention Span: Attends with cues to redirect  Memory: Decreased recall of recent events;Decreased short term memory  Safety Judgement: Decreased awareness of need for assistance  Problem Solving: Assistance required to generate solutions;Assistance required to implement solutions  Insights: Decreased awareness of deficits  Initiation: Requires cues for some  Orientation  Overall Orientation Status: Within Functional Limits (knew the date, self and approximately what happened to him)                  Education Given To: Patient  Education Provided: Role of Therapy;Plan of Care;ADL Adaptive Strategies;Transfer Training;Energy Conservation; Fall Prevention Strategies  Education Method: Demonstration  Barriers to Learning: Cognition  Education Outcome: Continued education needed  LUE AROM (degrees)  LUE AROM : WFL  RUE AROM (degrees)  RUE AROM : WFL             OutComes Score   Greyson Dockery scored a 16/24 on the AM-PAC ADL Inpatient form. Current research shows that an AM-PAC score of 17 or less is typically not associated with a discharge to the patient's home setting. Based on the patient's AM-PAC score and their current ADL deficits, it is recommended that the patient have 3-5 sessions per week of Occupational Therapy at d/c to increase the patient's independence. Please see assessment section for further patient specific details. If patient discharges prior to next session this note will serve as a discharge summary. Please see below for the latest assessment towards goals.                                                   AM-PAC Score        AM-PAC Inpatient Daily Activity Raw Score: 16 (08/08/22 1454)  AM-PAC Inpatient ADL T-Scale Score : 35.96 (08/08/22 1454)  ADL Inpatient CMS 0-100% Score: 53.32 (08/08/22 1454)  ADL Inpatient CMS G-Code Modifier : CK (08/08/22 1454)    [place] Score       Goals  Short Term Goals  Time Frame for Short term goals: 1 week  Short Term Goal 1: Pt will be Ind. with functional transfers  Short Term Goal 2: Pt will be ind. with funcitonal mobility  Short Term Goal 3: Pt will be I with toileting  Short Term Goal 4: Pt will be I with bathing and dressing  Short Term Goal 5: Pt will be I with bed mobility  Patient Goals   Patient goals : Pt did not state a goal, will work to achieve PLOF       Therapy Time   Individual Concurrent Group Co-treatment   Time In 1410         Time Out 1450         Minutes 40         Timed Code Treatment Minutes: 40 Minutes       Alejandra Cabrera, OT

## 2022-08-08 NOTE — PROGRESS NOTES
Comprehensive Nutrition Assessment    Type and Reason for Visit:  Reassess    Nutrition Recommendations/Plan:   Minced and Moist diet   Texture/liquid level per SLP  Ensure BID     Malnutrition Assessment:  Malnutrition Status:  Insufficient data (08/04/22 1235)    Context:  Chronic Illness       Nutrition Assessment:    Follow-up. Pt now on diet, SLP eval complete. Pt now on Minced and Moist diet, eating less than 50% of meals currently. Will trial supplement. Nutrition Related Findings:    very [place] Wound Type: None       Current Nutrition Intake & Therapies:    Average Meal Intake: 1-25%  Average Supplements Intake: NPO  ADULT DIET; Dysphagia - Minced and Moist    Anthropometric Measures:  Height: 5' 7\" (170.2 cm)  Ideal Body Weight (IBW): 148 lbs (67 kg)    Admission Body Weight: 151 lb (68.5 kg)  Current Body Weight: 148 lb (67.1 kg), 100 % IBW. Weight Source: Bed Scale  Current BMI (kg/m2): 23.2        Weight Adjustment For: No Adjustment                 BMI Categories: Normal Weight (BMI 18.5-24. 9)    Estimated Daily Nutrient Needs:  Energy Requirements Based On: Kcal/kg  Weight Used for Energy Requirements: Current  Energy (kcal/day): 6477-2619 (25-30kcal/67kg)  Weight Used for Protein Requirements: Current  Protein (g/day): 80-94 (1.2-1.4g/67kg)  Method Used for Fluid Requirements: 1 ml/kcal  Fluid (ml/day): 1 ml/kcal    Nutrition Diagnosis:   Inadequate oral intake related to cognitive or neurological impairment, swallowing difficulty as evidenced by intake 0-25%    Nutrition Interventions:   Food and/or Nutrient Delivery: Continue Current Diet, Start Oral Nutrition Supplement  Nutrition Education/Counseling: No recommendation at this time  Coordination of Nutrition Care: Continue to monitor while inpatient       Goals:  Previous Goal Met: Progressing toward Goal(s)  Goals: other (specify)  Specify Other Goals: Initiate most appropriate form of nutrition by next RD assessment.     Nutrition Monitoring and Evaluation:   Behavioral-Environmental Outcomes: None Identified  Food/Nutrient Intake Outcomes: Diet Advancement/Tolerance, Food and Nutrient Intake  Physical Signs/Symptoms Outcomes: Chewing or Swallowing, GI Status, Nausea or Vomiting, Fluid Status or Edema, Meal Time Behavior, Nutrition Focused Physical Findings, Skin, Weight    Discharge Planning:     Too soon to determine     Vikram Ziegler, [address] [place] [address],   Contact: [phone]

## 2022-08-08 NOTE — PROGRESS NOTES
[facility]    Respiratory Therapy   Home Oxygen Evaluation        Name: [id number] [facility] Record Number: [id number]  Age: 77 y.o. Gender:  male   : 1945  Today's date: 2022  Room: T3N-9513/5264-01      Assessment        /78   Pulse 72   Temp 98.3 °F (36.8 °C) (Oral)   Resp 25   Ht 5' 7\" (1.702 m)   Wt 148 lb 13 oz (67.5 kg)   SpO2 96%   PF (!) 3 L/min   BMI 23.31 kg/m²     Patient Active Problem List   Diagnosis    Community acquired pneumonia, unspecified laterality       Social History:  Social History     Tobacco Use    Smoking status: Every Day     Packs/day: 1.00     Types: Cigarettes    Smokeless tobacco: Never   Substance Use Topics    Alcohol use: Never    Drug use: Never       PT DOES NOT QUALIFY FOR HOME O2 AT THIS TIME    Patient Room Air saturation at rest 90 %  Patient Room Air saturation upon ambulation 93 %    Oxygen saturations of 88% or less on RA qualifies patient for Home Oxygen      In your clinical assessment does the Patient Require Portable Oxygen Tanks?     Yes                Patient/caregiver was educated on Home Oxygen process:  Yes      Level of patient/caregiver understanding able to:   [] Verbalize understanding   [] Demonstrate understanding       [] Teach back        [x] Needs reinforcement        []  No available caregiver               []  Other:     Response to education:  Poor     Time Spent with Home O2 Set Up:  10  minutes     Sumaya Turner RCP on 2022 at 12:26 PM

## 2022-08-08 NOTE — PROGRESS NOTES
Hospital Medicine Progress Note     Date:  8/8/2022    PCP: No primary care provider on file. (Tel: None)    Date of Admission: 8/3/2022    Chief complaint:   Chief Complaint   Patient presents with    Shortness of Breath     Pt was picked up outside of [facility] , EMS reports pts confused not really answering, pt looks bad ,        Brief admission history: 81-year-old male with history of dermatitis, tobacco use disorder, marijuana use disorder, occasional alcohol use, who was brought to the emergency room on 8/3/2022 for evaluation of encephalopathy. Patient was reportedly found confused outside of [facility]. Presentation to the emergency room, he has multiple lab and imaging studies that are abnormal, including slightly elevated LFTs, creatinine of 1.4 (baseline unknown), anion gap metabolic acidosis, elevated creatinine kinase of 5440, lactic acid of 2.3, anemia with low MCV. X-ray of right reports unremarkable for acute fracture. CT does chest reveals left upper lung infiltrate and multiple pulmonary nodules (for which follow-up CAT scan is recommended). He has nonzero troponin of 0.05, repeat of 0.04. Urine drug screen was unremarkable. He was admitted for management of bacterial pneumonia, sepsis secondary to pneumonia, anion gap metabolic acidosis, rhabdomyolysis, acute metabolic encephalopathy. Assessment/plan:  Bacterial pneumonia, sepsis secondary to pneumonia. Sepsis resolved. Cultures did not grow any organism. MRSA nares negative. Patient was on Zyvox and Zosyn; discontinued Zyvox on 8/7/2022 (received from 8/4/2022 to 8/7/2022), discontinued Zosyn on 8/8/2022. He will complete 2 more days of oral antibiotics with Augmentin. Acute respiratory failure with hypoxia. Continue supplemental oxygen and wean as tolerated. Home oxygen evaluation for discharge purposes. Acute metabolic encephalopathy.   Etiology is multifactorial, including underlying infectious process, possible underlying cognitive impairment. CT-head is unremarkable. Urine drug screen negative. Ammonia level normal.  Infection appears to be much controlled at this time. Patient has hearing impairment, which may be complicating assessment. Acute kidney injury, likely prerenal azotemia, resolved with hydration. Anion gap metabolic acidosis, resolved. Rhabdomyolysis, likely nontraumatic, resolved with hydration. Elevated transaminases. Likely secondary to rhabdomyolysis. Acute hepatitis profile is positive for hepatitis B surface antibody, confirmation pending. 430 core antibody IgM is nonreactive. Microcytic anemia. Iron studies consistent with iron deficiency anemia. Hemoglobin stable. Multiple pulmonary nodules noted on CT of the chest.  Patient will benefit from follow-up CT-chest in about 6 months. Electrolyte abnormalities. Continue replacement protocols. Other comorbidities: history of dermatitis, tobacco use disorder, marijuana use disorder, occasional alcohol use. Disposition. Wean supplemental oxygen. Home oxygen evaluation. PT/OT consult. Will likely need placement.  consult in place. Diet: ADULT DIET; Dysphagia - Minced and Moist    Code status: Full Code   ----------  Subjective  Extremely hard ofhearing. Denies any needs. Objective  Physical exam:  Vitals: /78   Pulse 72   Temp 98.3 °F (36.8 °C) (Oral)   Resp 25   Ht 5' 7\" (1.702 m)   Wt 148 lb 13 oz (67.5 kg)   SpO2 96%   PF (!) 3 L/min   BMI 23.31 kg/m²   Gen/overall appearance: Not in acute distress. Extremely hard of hearing. Somewhat confused, but improved. Alert. 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 Oral mucosa moist  Neck: No JVD, thyromegaly  CVS: Nml S1S2, no MRG, RRR  Pulm: Mostly clear with no wheezes. Gastrointestinal: Soft, NT/ND, +BS  Musculoskeletal: No edema. Warm  Neuro: No focal deficit.  Moves extremity spontaneously. Psychiatry: Appropriate affect. Not agitated. Skin: Warm, dry with normal turgor. No rash  Capillary refill: Brisk,< 3 seconds   Peripheral Pulses: +2 palpable, equal bilaterally     24HR INTAKE/OUTPUT:    Intake/Output Summary (Last 24 hours) at 8/8/2022 1209  Last data filed at 8/8/2022 0814  Gross per 24 hour   Intake 878.09 ml   Output 900 ml   Net -21.91 ml       I/O last 3 completed shifts: In: 1416.8 [P.O.:300; I.V.:88.5; IV Piggyback:1028.3]  Out: 900 [Urine:900]  I/O this shift:   In: 878.1 [IV Piggyback:878.1]  Out: -   Meds:    amoxicillin-clavulanate  1 tablet Oral 2 times per day    lactobacillus  1 capsule Oral BID WC    docusate sodium  100 mg Oral BID    potassium bicarb-citric acid  40 mEq Oral Daily    sodium chloride flush  10 mL IntraVENous 2 times per day    pantoprazole (PROTONIX) 40 mg injection  40 mg IntraVENous Q12H    ferrous sulfate  324 mg Oral Daily with breakfast    ipratropium-albuterol  1 ampule Inhalation Q4H WA     Infusions:    sodium chloride Stopped (08/07/22 0324)     PRN Meds: sodium chloride flush, sodium chloride, potassium chloride, magnesium sulfate, promethazine **OR** ondansetron, acetaminophen **OR** acetaminophen, labetalol, albuterol sulfate HFA    Labs/imaging:  CBC:   Recent Labs     08/06/22  0541 08/07/22 0628 08/08/22  0604   WBC 5.3 6.1 5.7   HGB 9.9* 9.5* 10.2*    169 210       BMP:    Recent Labs     08/06/22  0541 08/07/22  0628 08/08/22  0604    132* 136   K 3.1* 3.1* 3.2*    98* 102   CO2 20* 21 21   BUN 9 9 7   CREATININE 0.9 0.8 0.9   GLUCOSE 88 167* 97       Hepatic:   Recent Labs     08/06/22  0541 08/07/22  0628 08/08/22  0604   AST 92* 63* 46*   ALT 26 22 19   BILITOT 0.4 0.5 0.6   ALKPHOS 47 55 56         Yany Rasmussen MD  -------------------------------  [facility] Center[facility]

## 2022-08-09 LAB
A/G RATIO: 0.7 (ref 1.1–2.2)
ALBUMIN SERPL-MCNC: 2.8 G/DL (ref 3.4–5)
ALP BLD-CCNC: 60 U/L (ref 40–129)
ALT SERPL-CCNC: 19 U/L (ref 10–40)
ANION GAP SERPL CALCULATED.3IONS-SCNC: 15 MMOL/L (ref 3–16)
AST SERPL-CCNC: 36 U/L (ref 15–37)
BASOPHILS ABSOLUTE: 0 K/UL (ref 0–0.2)
BASOPHILS RELATIVE PERCENT: 0.8 %
BILIRUB SERPL-MCNC: 0.5 MG/DL (ref 0–1)
BUN BLDV-MCNC: 10 MG/DL (ref 7–20)
CALCIUM SERPL-MCNC: 8.8 MG/DL (ref 8.3–10.6)
CHLORIDE BLD-SCNC: 110 MMOL/L (ref 99–110)
CO2: 22 MMOL/L (ref 21–32)
CREAT SERPL-MCNC: 0.9 MG/DL (ref 0.8–1.3)
EOSINOPHILS ABSOLUTE: 0.2 K/UL (ref 0–0.6)
EOSINOPHILS RELATIVE PERCENT: 3.7 %
GFR AFRICAN AMERICAN: >60
GFR NON-AFRICAN AMERICAN: >60
GLUCOSE BLD-MCNC: 100 MG/DL (ref 70–99)
HCT VFR BLD CALC: 31.5 % (ref 40.5–52.5)
HEMOGLOBIN: 9.9 G/DL (ref 13.5–17.5)
LYMPHOCYTES ABSOLUTE: 1.5 K/UL (ref 1–5.1)
LYMPHOCYTES RELATIVE PERCENT: 25.1 %
MAGNESIUM: 1.9 MG/DL (ref 1.8–2.4)
MCH RBC QN AUTO: 22.6 PG (ref 26–34)
MCHC RBC AUTO-ENTMCNC: 31.5 G/DL (ref 31–36)
MCV RBC AUTO: 71.8 FL (ref 80–100)
MONOCYTES ABSOLUTE: 0.4 K/UL (ref 0–1.3)
MONOCYTES RELATIVE PERCENT: 6.2 %
NEUTROPHILS ABSOLUTE: 3.9 K/UL (ref 1.7–7.7)
NEUTROPHILS RELATIVE PERCENT: 64.2 %
PDW BLD-RTO: 20.3 % (ref 12.4–15.4)
PHOSPHORUS: 2.2 MG/DL (ref 2.5–4.9)
PLATELET # BLD: 265 K/UL (ref 135–450)
PMV BLD AUTO: 7.3 FL (ref 5–10.5)
POTASSIUM SERPL-SCNC: 3.4 MMOL/L (ref 3.5–5.1)
RBC # BLD: 4.39 M/UL (ref 4.2–5.9)
SODIUM BLD-SCNC: 147 MMOL/L (ref 136–145)
TOTAL PROTEIN: 6.7 G/DL (ref 6.4–8.2)
WBC # BLD: 6 K/UL (ref 4–11)

## 2022-08-09 PROCEDURE — 97116 GAIT TRAINING THERAPY: CPT

## 2022-08-09 PROCEDURE — 84100 ASSAY OF PHOSPHORUS: CPT

## 2022-08-09 PROCEDURE — 94761 N-INVAS EAR/PLS OXIMETRY MLT: CPT

## 2022-08-09 PROCEDURE — 1200000000 HC SEMI PRIVATE

## 2022-08-09 PROCEDURE — 2580000003 HC RX 258: Performed by: INTERNAL MEDICINE

## 2022-08-09 PROCEDURE — 97530 THERAPEUTIC ACTIVITIES: CPT

## 2022-08-09 PROCEDURE — 83735 ASSAY OF MAGNESIUM: CPT

## 2022-08-09 PROCEDURE — 94640 AIRWAY INHALATION TREATMENT: CPT

## 2022-08-09 PROCEDURE — 6370000000 HC RX 637 (ALT 250 FOR IP): Performed by: INTERNAL MEDICINE

## 2022-08-09 PROCEDURE — 92526 ORAL FUNCTION THERAPY: CPT

## 2022-08-09 PROCEDURE — 36415 COLL VENOUS BLD VENIPUNCTURE: CPT

## 2022-08-09 PROCEDURE — 85025 COMPLETE CBC W/AUTO DIFF WBC: CPT

## 2022-08-09 PROCEDURE — C9113 INJ PANTOPRAZOLE SODIUM, VIA: HCPCS | Performed by: INTERNAL MEDICINE

## 2022-08-09 PROCEDURE — 6360000002 HC RX W HCPCS: Performed by: INTERNAL MEDICINE

## 2022-08-09 PROCEDURE — 80053 COMPREHEN METABOLIC PANEL: CPT

## 2022-08-09 RX ORDER — AMOXICILLIN AND CLAVULANATE POTASSIUM 875; 125 MG/1; MG/1
1 TABLET, FILM COATED ORAL ONCE
Status: COMPLETED | OUTPATIENT
Start: 2022-08-09 | End: 2022-08-09

## 2022-08-09 RX ADMIN — Medication 40 MG: at 21:03

## 2022-08-09 RX ADMIN — SODIUM CHLORIDE, PRESERVATIVE FREE 10 ML: 5 INJECTION INTRAVENOUS at 21:59

## 2022-08-09 RX ADMIN — FERROUS SULFATE TAB EC 324 MG (65 MG FE EQUIVALENT) 324 MG: 324 (65 FE) TABLET DELAYED RESPONSE at 07:44

## 2022-08-09 RX ADMIN — Medication 40 MG: at 07:44

## 2022-08-09 RX ADMIN — AMOXICILLIN AND CLAVULANATE POTASSIUM 1 TABLET: 875; 125 TABLET, FILM COATED ORAL at 16:34

## 2022-08-09 RX ADMIN — SODIUM CHLORIDE, PRESERVATIVE FREE 10 ML: 5 INJECTION INTRAVENOUS at 07:46

## 2022-08-09 RX ADMIN — DOCUSATE SODIUM 100 MG: 100 CAPSULE, LIQUID FILLED ORAL at 07:44

## 2022-08-09 RX ADMIN — IPRATROPIUM BROMIDE AND ALBUTEROL SULFATE 1 AMPULE: 2.5; .5 SOLUTION RESPIRATORY (INHALATION) at 07:58

## 2022-08-09 RX ADMIN — SODIUM CHLORIDE, PRESERVATIVE FREE 10 ML: 5 INJECTION INTRAVENOUS at 00:20

## 2022-08-09 RX ADMIN — IPRATROPIUM BROMIDE AND ALBUTEROL SULFATE 1 AMPULE: 2.5; .5 SOLUTION RESPIRATORY (INHALATION) at 16:06

## 2022-08-09 RX ADMIN — POTASSIUM BICARBONATE 40 MEQ: 782 TABLET, EFFERVESCENT ORAL at 07:44

## 2022-08-09 RX ADMIN — AMOXICILLIN AND CLAVULANATE POTASSIUM 1 TABLET: 875; 125 TABLET, FILM COATED ORAL at 07:44

## 2022-08-09 RX ADMIN — Medication 1 CAPSULE: at 07:44

## 2022-08-09 NOTE — PROGRESS NOTES
DC plan is to go to [facility] tomorrow. Other than that no acute changes with pt. Tele sitter is on in room as pt had a fall overnight. No falls during dayshift though pt is impulsive and frequently sets off alarm. Did have an issue with pt's wife today while  Raissa Cabrera was discussing discharge plan to pt and the wife. I was not present for the conversation but I did hear her yelling obscenity from out in the hallway and getting angry with Raissa Cabrera, [address]. When I entered the room Raissa Cabrera was leaving and said she would return later. Security and Charge RN were notified and they de-escalated the wife. No further problems since then.

## 2022-08-09 NOTE — PROGRESS NOTES
[facility]   Speech Therapy  Daily Dysphagia Treatment Note    Alan Liner  AGE: 77 y.o. GENDER: male  : 1945  [id number]  EPISODE DATE:  8/3/2022  Patient Active Problem List   Diagnosis    Community acquired pneumonia, unspecified laterality     No Known Allergies    Treatment Diagnosis: Dysphagia     Chart review:   MD ADMISSION H&P HPI: 77 y.o. male who presented to [facility] with past medical history of HF resented ED with chief complaint of shortness of breath. History limited from patient as patient is encephalopathic. Per report patient was picked up from outside [facility] was confused answering,. Patient was noted to be hypoxic thus was transferred here for further evaluation     IMAGING:  CXR: 8/3/2022  Impression   No acute process. CT CHEST: 8/3/2022  Impression   1. Nonspecific curvilinear consolidative opacity within the paramediastinal   aspect of the left upper lobe, demonstrating internal bronchograms, which   could represent either pneumonia or parenchymal scar. Suggest appropriate   clinical treatment, and short-term chest CT follow-up in 6-8 weeks to ensure   resolution of this consolidative opacity. 2. Additional subpleural curvilinear and ground-glass opacity within the   anterior left upper lobe likely reflects either additional atelectasis,   pneumonia, or parenchymal scarring. 3. Moderate emphysema. 4. Multiple scattered subcentimeter noncalcified nodules throughout both   lungs, the largest measuring 5 mm within the subpleural right lower lobe. Most likely benign granulomatous disease, further follow-up of these nodules   is as advised below. Namely, given patient's high risk status, recommend   chest CT follow-up in 12 months. 5. Mild mediastinal lymphadenopathy is most likely benign and reactive in   etiology given the patient's lung findings.   However, this should also be   followed at the time of airspace opacity and pulmonary nodule follow-up to   ensure its stability or resolution as well. CT HEAD: 8/3/2022  Impression   No evidence of acute intracranial abnormality. CHEST X-RAY 8/6/22  Impression   Small bilateral pleural effusions and bibasilar airspace disease which can   reflect pneumonia or pulmonary edema. CT HEAD 8/7/22  Impression   No evidence of acute intracranial abnormality with no significant change in   the appearance of the head CT when compared to the study of 08/03/2022. Subjective:     Current Diet Level: Dysphagia II Minced and Moist ; Thin liquids      Comments regarding tolerating Current Diet: RN denies concern    Objective:     Pain: Did not state               Vision: [x]WFL (adequate for assessment needs) []Impaired:  Hearing: [x]WFL []Impaired:     Cognitive/behavioral/communication:   Oriented to [x] self [x] Place (kind of place, unable to recall name) [] date [] situation  []alert []lethargic  []cooperative []self-limiting   []confused   []distractible []agitated []impulsive  []verbally responsive []nonverbal []limited verbal responses  []follows one step commands []does not follow dx []follows complex commands  []aphasic []dysarthric  [] other:     Respiratory Status: [x]Room Air []O2 via nasal cannula []Other:  Dentition: []Adequate []Dentures [x]Missing MOST Teeth []Edentulous []Other:  Vocal Quality: []Normal []Dysphonic  []Aphonic  []Hoarse []Wet [x]Weak []Other:  Volitional Cough: []Strong []Weak []Wet []Absent []Congested [x]Other: JOSIAS  Volitional Swallow:   []Absent  []Delayed []Adequate []Required use of drink  JOSIAS    Oral Mechanism Exam:  []WFL [x]Mild   [x] Moderate  []Severe  []To be assessed  Impaired:   []Left side      []Right side    [x]Labial ROM/Coordination    []Labial Symmetry   [x]Lingual ROM/Coordination   []Lingual Symmetry  []Gag  []Other:     Patient Positioning: Upright in chair    PO Trials:   Thin Liquids via straw: suspect premature loss of bolus, delayed swallow by delayed swallow, decreased laryngeal elevation, no overt s/s of aspiration or penetration, even when challenged with serial thin boluses via straw  Limited assessment for toleration of soft and bite-sized as patient only agreeable to consume a couple bites this AM, concern for excessive labor  ST to continue to monitor during acute admission    Recommended Diet and Intervention 8/9/2022:  Diet Solids Recommendation:  Dysphagia II Minced and Moist  Liquid Consistency Recommendation: Thin liquids  Recommended form of Meds: PO as tolerated     Compensatory Swallowing Strategies: Alternate solids/liquids , Upright as possible with all PO intake , Small bites/sips , Remain upright 30-45 min     EDUCATION:   Provided education regarding role of SLP, results of assessment, recommendations and general speech pathology plan of care. [] Pt verbalized understanding and agreement   [x] Pt requires ongoing learning   [x] No evidence of comprehension     Dysphagia Prognosis: [] good [x]fair []poor []guarded     Confusion and Cognitive deficit    Plan:     Continue Dysphagia Therapy: YES    Interventions: Diet Tolerance Monitoring , Patient/Family Education   Duration/Frequency of therapy while on unit: Speech therapy for dysphagia tx 3-5 times per week during acute care stay. Discharge Instructions:   Discharge recommendations to be determined pending ongoing follow-up during acute care stay    This note serves as a D/C Summary in the event that this patient is discharged prior to the next therapy session.     Coded treatment time: 0  Total treatment time: Elvie, [address] [place] [address] Huy, [facility]  Speech-Language Pathologist  On 08/09/22 at 8:01 AM

## 2022-08-09 NOTE — CARE COORDINATION
Discharge Planning:   PT/OT: Patient would benefit from continued therapy after discharge (3-5x/wk)     Met with patient and wife at bedside to discuss discharge plan. Patient did not speak during conversation. Discussed discharge plan with wife. Wife reports they are hopeful to discharge to a nursing facility together due to no other living arrangement at this time. Wife provided her insurance card, copy made, card given back to wife. Reviewed SNF list. Wife reports they are familiar with [address] [place] [address] and would like a referral placed. The Plan for Transition of Care is related to the following treatment goals: skilled nursing facility     The Patient and wife were provided with a choice of provider and agrees   with the discharge plan. [x] Yes [] No    Freedom of choice list was provided with basic dialogue that supports the patient's individualized plan of care/goals, treatment preferences and shares the quality data associated with the providers. [x] Yes [] No    Referral given to Kaci Best in admissions at [address] [place] [address]. Notified of the scenario and prior living arrangement. Await confirmation if they can accept.      SATNAM Naranjo, NGUYEN, Social Work/Case Management   [phone]  Electronically signed by SATNAM Naranjo, NGUYEN on 8/9/2022 at 1:20 PM

## 2022-08-09 NOTE — PROGRESS NOTES
Hospital Medicine Progress Note     Date:  8/9/2022    PCP: No primary care provider on file. (Tel: None)    Date of Admission: 8/3/2022    Chief complaint:   Chief Complaint   Patient presents with    Shortness of Breath     Pt was picked up outside of [facility] , EMS reports pts confused not really answering, pt looks bad ,        Brief admission history: 81-year-old male with history of dermatitis, tobacco use disorder, marijuana use disorder, occasional alcohol use, who was brought to the emergency room on 8/3/2022 for evaluation of encephalopathy. Patient was reportedly found confused outside of [employer]. Presentation to the emergency room, he has multiple lab and imaging studies that are abnormal, including slightly elevated LFTs, creatinine of 1.4 (baseline unknown), anion gap metabolic acidosis, elevated creatinine kinase of 5440, lactic acid of 2.3, anemia with low MCV. X-ray of right reports unremarkable for acute fracture. CT does chest reveals left upper lung infiltrate and multiple pulmonary nodules (for which follow-up CAT scan is recommended). He has nonzero troponin of 0.05, repeat of 0.04. Urine drug screen was unremarkable. He was admitted for management of bacterial pneumonia, sepsis secondary to pneumonia, anion gap metabolic acidosis, rhabdomyolysis, acute metabolic encephalopathy. Assessment/plan:  Bacterial pneumonia, sepsis secondary to pneumonia. Sepsis resolved. Cultures did not grow any organism. MRSA nares negative. Patient was on Zyvox and Zosyn; discontinued Zyvox on 8/7/2022 (received from 8/4/2022 to 8/7/2022), discontinued Zosyn on 8/8/2022. He will complete course of antibiotics with augmentin 08/09/2022. Acute respiratory failure with hypoxia, resolved. He has since been weaned off supplemental oxygen. He was evaluated for home oxygen needs and did not qualify. Acute metabolic encephalopathy, resolved.   Etiology is multifactorial, including +BS  Musculoskeletal: No edema. Warm  Neuro: No focal deficit. Moves extremity spontaneously. Psychiatry: Appropriate affect. Not agitated. Skin: Warm, dry with normal turgor. No rash  Capillary refill: Brisk,< 3 seconds   Peripheral Pulses: +2 palpable, equal bilaterally     24HR INTAKE/OUTPUT:    Intake/Output Summary (Last 24 hours) at 8/9/2022 1203  Last data filed at 8/9/2022 1046  Gross per 24 hour   Intake 540 ml   Output 700 ml   Net -160 ml       I/O last 3 completed shifts:   In: 1178.1 [P.O.:300; IV Piggyback:878.1]  Out: 1350 [Urine:1350]  I/O this shift:  In: 240 [P.O.:240]  Out: 150 [Urine:150]  Meds:    amoxicillin-clavulanate  1 tablet Oral 2 times per day    lactobacillus  1 capsule Oral BID WC    docusate sodium  100 mg Oral BID    potassium bicarb-citric acid  40 mEq Oral Daily    sodium chloride flush  10 mL IntraVENous 2 times per day    pantoprazole (PROTONIX) 40 mg injection  40 mg IntraVENous Q12H    ferrous sulfate  324 mg Oral Daily with breakfast    ipratropium-albuterol  1 ampule Inhalation Q4H WA     Infusions:    sodium chloride Stopped (08/07/22 0324)     PRN Meds: sodium chloride flush, sodium chloride, potassium chloride, magnesium sulfate, promethazine **OR** ondansetron, acetaminophen **OR** acetaminophen, labetalol, albuterol sulfate HFA    Labs/imaging:  CBC:   Recent Labs     08/07/22 0628 08/08/22  0604 08/09/22  0709   WBC 6.1 5.7 6.0   HGB 9.5* 10.2* 9.9*    210 265       BMP:    Recent Labs     08/07/22 0628 08/08/22  0604 08/09/22  0709   * 136 147*   K 3.1* 3.2* 3.4*   CL 98* 102 110   CO2 21 21 22   BUN 9 7 10   CREATININE 0.8 0.9 0.9   GLUCOSE 167* 97 100*       Hepatic:   Recent Labs     08/07/22 0628 08/08/22  0604 08/09/22  0709   AST 63* 46* 36   ALT 22 19 19   BILITOT 0.5 0.6 0.5   ALKPHOS 54 56 60         Corina Barger MD  -------------------------------  [facility]

## 2022-08-09 NOTE — PROGRESS NOTES
Physical Therapy  [place] [facility]    Chart reviewed. PT attempted to see patient for follow up treatment this date although patient currently declining to participate secondary to fatigue, \"rough night last night\" - discussed with RN, plan to reattempt at 12:00 - patient agreeable. Plan to continue to follow per plan of care.     Randall Morales PT, DPT [id number]

## 2022-08-09 NOTE — CARE COORDINATION
Discharge Planning:   Spoke to Sherrie Vásquez with [facility]. Confirmed they are able to accept patient for SNF placement and will submit for precert. Sherrie Vásquez is aware of the goal to have patient and wife placed in the same facility. Confirmed wife would need a History and Physical, and a medical facesheet to confirm it is appropriate placement for her. Met with patient and wife at bedside to discuss. Patient was engaged during discussion of skilled placement at discharge. Patient agreeable to [address] [place] [address] for skilled rehab at discharge. Wife began to express concerns regarding her plan when patient discharges. Wife reports she may have an appointment on 8/10/22 for primary care doctor, but unsure if she is going to attend. Notified wife of process to seek placement at [address] [place] [address]. Wife began to express concerns regarding finding an apartment. Wife stated the last apartment was \"not the right fit. \" Patient became minimally engaged in discussion at this time. Wife began to appear escalated as she raised her voice stating, \"it is an all  black American neighborhood and we do not fit in. \" I inquired on the process they took to obtain apartment. Wife responded with \"What makes you think we fit in there,\" and proceeded to use obscenities and derogatory words. I requested wife deescalate and confirmed I will return when she has calmed down to further address discharge plan.      Electronically signed by SATNAM Medina, KARONW on 8/9/2022 at 4:44 PM

## 2022-08-09 NOTE — PROGRESS NOTES
concerns. Patient will continue to benefit from additional skilled PT intervention to facilitate safe mobility and to optimize (I) to promote return to prior level of function. Treatment Diagnosis: impaired functional mobility  Therapy Prognosis: Good  Barriers to Learning: cognition  Activity Tolerance  Activity Tolerance: Patient tolerated treatment well;Patient limited by endurance     Plan   Plan  Plan: 3-5 times per week  Current Treatment Recommendations: Strengthening, Balance training, Functional mobility training, Transfer training, Gait training, Patient/Caregiver education & training, Therapeutic activities, ROM, Home exercise program, Safety education & training, Equipment evaluation, education, & procurement  Safety Devices  Type of Devices: Call light within reach, Chair alarm in place, Gait belt, Left in chair, Nurse notified     Restrictions  Restrictions/Precautions  Restrictions/Precautions: Fall Risk (high fall risk)  Position Activity Restriction  Other position/activity restrictions: CDiff contact precautions, up with assistance, adult diet - dysphagia minced and moist     Subjective   General  Chart Reviewed: Yes  Additional Pertinent Hx: Per H&P, \"66 y.o. male who presented to [facility] with past medical history of HF resented ED with chief complaint of shortness of breath. History limited from patient as patient is encephalopathic. Per report patient was picked up from outside [employer] was confused answering,. Patient was noted to be hypoxic thus was transferred here for further evaluation\"  Family / Caregiver Present: Yes (wife on couch)  Follows Commands: Impaired  General Comment  Comments: Patient seated in recliner upon arrival - agreeable to PT. Subjective  Subjective: Patient denies pain, reports \"I just ate this sandwich\" - pointing to 1/2 sandwich remaining on tray - RN notified as patient is on minced and moist diet.          Social/Functional History  Social/Functional History  Lives With: Significant other  Type of Home: Homeless  Has the patient had two or more falls in the past year or any fall with injury in the past year?: Yes (no fractures per imaging, multiple bruises)  Additional Comments: Per chart, recently lost apartment and was living in car. Car now not working so may have been sleeping outside. Wife present in room but did not engage with therapists. Documentation also alludes to possible concerns over wife's ability to make decisions    Cognition   Orientation  Overall Orientation Status: Impaired  Orientation Level: Oriented to person;Disoriented to time;Disoriented to situation;Oriented to place  Cognition  Arousal/Alertness: Delayed responses to stimuli  Following Commands: Follows one step commands with repetition; Follows one step commands with increased time  Attention Span: Attends with cues to redirect  Memory: Decreased recall of recent events;Decreased short term memory  Safety Judgement: Decreased awareness of need for assistance;Decreased awareness of need for safety  Problem Solving: Assistance required to generate solutions;Assistance required to implement solutions  Insights: Decreased awareness of deficits  Initiation: Requires cues for some  Sequencing: Requires cues for some     Objective          Transfers  Sit to Stand: Contact guard assistance  Stand to sit: Contact guard assistance  Stand Pivot Transfers: Contact guard assistance  Comment: without an assistive device; declining to trial with RW despite PT education  Ambulation  Surface: level tile  Device: No Device  Assistance: Contact guard assistance;Minimal assistance  Quality of Gait: staggering gait, variable base of support, decreased (B) foot clearance, step length, and heel strike; min A for turns and confined spaces with generalized unsteadiness exacerbated by fatigue and distraction  Distance: 50ft, 30ft     Balance  Posture: Fair (forward flexed, rounded shoulders)  Sitting - Static: Good  Sitting - Dynamic: Good  Standing - Static: Fair  Standing - Dynamic: Fair;-  Comments: CGA to min A for standing balance without an assistive device         AM-PAC Score  AM-PAC Inpatient Mobility Raw Score : 17 (08/09/22 1433)  AM-PAC Inpatient T-Scale Score : 42.13 (08/09/22 1433)  Mobility Inpatient CMS 0-100% Score: 50.57 (08/09/22 1433)  Mobility Inpatient CMS G-Code Modifier : CK (08/09/22 1433)          Goals  Short Term Goals  Time Frame for Short term goals: until d/c - all goals ongoing 8/9  Short term goal 1: Pt will perform bed mobility with supervision  Short term goal 2: Pt will perform transfers with SBA  Short term goal 3: Pt will ambulate 100' without appropriate AD and CGA  Patient Goals   Patient goals : per chart review, SNF; patient reports \"I am getting better, almost back to normal.\"  Questionable insight related to deficits/safety. Education  Patient Education  Education Given To: Patient  Education Provided: Role of Therapy;Plan of Care  Education Provided Comments: use of call light, PT recommendations  Education Method: Demonstration;Verbal  Barriers to Learning: Cognition  Education Outcome: Continued education needed      Therapy Time   Individual Concurrent Group Co-treatment   Time In 1200         Time Out 1224         Minutes 24              Timed Code Treatment Minutes: 24 minutes    Total Treatment Minutes: 24 Minutes    If patient discharges prior to next treatment, this note will serve as discharge summary.     Nuris Payor PT, DPT #[id number]

## 2022-08-09 NOTE — DISCHARGE SUMMARY
Hospital Discharge Summary    Patient's PCP: No primary care provider on file. Admit Date: 8/3/2022   Discharge Date: 8/9/2022    Admitting Physician: Dr. Ginger Singleton admitting provider for patient encounter. Discharge Physician: Dr. Usha Rollins MD     Consults:   IP CONSULT TO SOCIAL WORK  IP CONSULT TO NEPHROLOGY  IP CONSULT TO PALLIATIVE CARE  IP CONSULT TO CASE MANAGEMENT  IP CONSULT TO SOCIAL WORK    Brief HPI: Patient admitted with bacterial pneumonia    Brief hospital course: 41-year-old male with history of dermatitis, tobacco use disorder, marijuana use disorder, occasional alcohol use, who was brought to the emergency room on 8/3/2022 for evaluation of encephalopathy. Patient was reportedly found confused outside of [employer]. Presentation to the emergency room, he has multiple lab and imaging studies that are abnormal, including slightly elevated LFTs, creatinine of 1.4 (baseline unknown), anion gap metabolic acidosis, elevated creatinine kinase of 5440, lactic acid of 2.3, anemia with low MCV. X-ray of right reports unremarkable for acute fracture. CT does chest reveals left upper lung infiltrate and multiple pulmonary nodules (for which follow-up CAT scan is recommended). He has nonzero troponin of 0.05, repeat of 0.04. Urine drug screen was unremarkable. He was admitted for management of bacterial pneumonia, sepsis secondary to pneumonia, anion gap metabolic acidosis, rhabdomyolysis, acute metabolic encephalopathy. Assessment/plan:  Bacterial pneumonia, sepsis secondary to pneumonia. Sepsis resolved. Cultures did not grow any organism. MRSA nares negative. Patient was on Zyvox and Zosyn; discontinued Zyvox on 8/7/2022 (received from 8/4/2022 to 8/7/2022), discontinued Zosyn on 8/8/2022. He will complete course of antibiotics with augmentin 08/09/2022. Acute respiratory failure with hypoxia, resolved. He has since been weaned off supplemental oxygen.   He was evaluated for home oxygen needs and did not qualify. Acute metabolic encephalopathy, resolved. Etiology is multifactorial, including underlying infectious process, possible underlying cognitive impairment. CT-head is unremarkable. Urine drug screen negative. Ammonia level normal.  Infection appears to be much controlled at this time. Patient has hearing impairment, which may be complicating assessment. Overall, confusion appears to have resolved at this time. Acute kidney injury, likely prerenal azotemia, resolved with hydration. Anion gap metabolic acidosis, resolved. Rhabdomyolysis, likely nontraumatic, resolved with hydration. Elevated transaminases. Likely secondary to rhabdomyolysis. Patient tested positive for hepatitis B, likely in window period. Microcytic anemia. Iron studies consistent with iron deficiency anemia. Hemoglobin stable. Multiple pulmonary nodules noted on CT of the chest.  Patient will benefit from follow-up CT-chest in about 6 months. Electrolyte abnormalities. Continue replacement protocols. Other comorbidities: history of dermatitis, tobacco use disorder, marijuana use disorder, occasional alcohol use. Disposition. Patient is stable for discharge at this time. Invasive procedures:  None. Discharge Diagnoses:   See above. Physical Exam: /79   Pulse 94   Temp 98 °F (36.7 °C) (Oral)   Resp 22   Ht 5' 7\" (1.702 m)   Wt 144 lb 6.4 oz (65.5 kg)   SpO2 91%   PF (!) 3 L/min   BMI 22.62 kg/m²   Gen/overall appearance: Not in acute distress. Extremely hard of hearing. Somewhat confused, but improved. Alert. 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 Oral mucosa moist  Neck: No JVD, thyromegaly  CVS: Nml S1S2, no MRG, RRR  Pulm: Mostly clear with no wheezes. Gastrointestinal: Soft, NT/ND, +BS  Musculoskeletal: No edema. Warm  Neuro: No focal deficit. Moves extremity spontaneously.   Psychiatry: Appropriate affect. Not agitated. Skin: Warm, dry with normal turgor. No rash  Capillary refill: Brisk,< 3 seconds  Peripheral Pulses: +2 palpable, equal bilaterally    Significant diagnostic studies that may require follow up:  Echo Complete    Result Date: 8/4/2022  Transthoracic Echocardiography Report (TTE)  Demographics   Patient Name       Santhosh Alejo   Date of Study      08/04/2022         Gender              Male   Patient Number     [id number]         Date of Birth       1945   Visit Number       [id number]          Age                 77 year(s)   Accession Number   [id number]         Room Number         2044   Corporate ID       [id number]            [place], [address] [facility], RT, [address], [facility]   Ordering Physician Umberto Mccarthy.,  Interpreting        [address] [place] [address].                     DO                 Physician           Helio Meier, Estela Vogt MD  Procedure Type of Study   TTE procedure:ECHOCARDIOGRAM COMPLETE 2D W DOPPLER W COLOR. Procedure Date Date: 08/04/2022 Start: 02:19 PM Study Location: [facility] Echo Lab Technical Quality: Adequate visualization Indications:Congestive heart failure. Patient Status: Routine Height: 67 inches Weight: 148 pounds BSA: 1.78 m2 BMI: 23.18 kg/m2 BP: 122/73 mmHg  Conclusions   Summary  Suboptimal image quality. Definity contrast administered. Normal left ventricle size, wall thickness, and systolic function with an  estimated ejection fraction of 60-65%. No regional wall motion abnormalities  are seen. Normal diastolic function. Normal right ventricular size and function. No significant valve abnormalities noted. A bubble study was performed and fails to show evidence of shunting.    Signature   ------------------------------------------------------------------  Electronically signed by Tiffany Marquez MD (Interpreting physician) on 08/04/2022 at 04:47 PM  ------------------------------------------------------------------   Findings   Left Ventricle  Suboptimal image quality. Definity contrast administered. Normal left ventricle size, wall thickness, and systolic function with an  estimated ejection fraction of 60-65%. No regional wall motion abnormalities  are seen. Normal diastolic function. Mitral Valve  Mitral valve is structurally normal. No evidence of mitral regurgitation or  stenosis. Left Atrium  Left atrium is of normal size. A bubble study was performed and fails to show evidence of shunting. Aortic Valve  The aortic valve is normal in structure and function. No evidence of aortic valve regurgitation or stenosis. Aorta  The aortic root is normal in size. Right Ventricle  Normal right ventricular size and function. TAPSE 2.24cm. RV S velocity 15.4 cm/s. Tricuspid Valve  The tricuspid valve was not well visualized. No evidence of tricuspid  regurgitation. No evidence of tricuspid stenosis. Right Atrium  The right atrium is normal in size. Pulmonic Valve  The pulmonic valve is not well visualized. No evidence of pulmonic valve  regurgitation. No evidence of pulmonic valve stenosis. Pericardial Effusion  No evidence of pericardial or pleural effusion. Miscellaneous  IVC not well visualized.   M-Mode/2D Measurements (cm)   LV Diastolic Dimension: 7.47 cm LV Systolic Dimension: 5.35 cm  LV Septum Diastolic: 9.21 cm  LV PW Diastolic: 4.43 cm        AO Root Dimension: 3.6 cm                                  AV Cusp Separation: 2 cm                                  LA Dimension: 2.6 cm                                  LA Area: 11.9 cm2                                  LA volume/Index: 20.9 ml /12 ml/m2  Doppler Measurements   AV Peak Velocity: 130 cm/s     MV Peak E-Wave: 106 cm/s  AV Peak Gradient: 6.76 mmHg    MV Peak A-Wave: 97 cm/s  AV Mean Gradient: 5 mmHg       MV E/A Ratio: 1.09 LVOT Peak Velocity: 131 cm/s   MV P1/2t: 72 msec   E' Septal Velocity: 12.6 cm/s  MV Deceleration Time: 140 msec  E' Lateral Velocity: 10.6 cm/s MV Area (PHT): 3.06 cm2  PV Peak Velocity: 103 cm/s  PV Peak Gradient: 4.24 mmHg   Aortic Valve   Peak Velocity: 130 cm/s  Mean Velocity: 103 cm/s  Peak Gradient: 6.76 mmHg Mean Gradient: 5 mmHg  AV VTI: 22.7 cm   Cusp Separation: 2 cm  Aorta   Aortic Root: 3.6 cm      XR HIP RIGHT (2-3 VIEWS)    Result Date: 8/3/2022  EXAMINATION: TWO XRAY VIEWS OF THE RIGHT HIP 8/3/2022 11:50 am COMPARISON: None. HISTORY: ORDERING SYSTEM PROVIDED HISTORY: bruise to right hip TECHNOLOGIST PROVIDED HISTORY: Reason for exam:->bruise to right hip Reason for Exam: bruised right hip FINDINGS: AP pelvis and two views of the right hip demonstrate no acute osseous abnormality. SI joints are maintained. Mild symmetric osteoarthritic changes of the hips with mild joint space narrowing and marginal spurring. No focal soft tissue abnormality. No acute osseous abnormality of the pelvis or right hip. CT HEAD WO CONTRAST    Result Date: 8/7/2022  EXAMINATION: CT OF THE HEAD WITHOUT CONTRAST  8/7/2022 10:19 am TECHNIQUE: CT of the head was performed without the administration of intravenous contrast. Automated exposure control, iterative reconstruction, and/or weight based adjustment of the mA/kV was utilized to reduce the radiation dose to as low as reasonably achievable. COMPARISON: 08/03/2022 HISTORY: ORDERING SYSTEM PROVIDED HISTORY: AMS TECHNOLOGIST PROVIDED HISTORY: Reason for exam:->AMS Has a \"code stroke\" or \"stroke alert\" been called? ->No Reason for Exam: ams FINDINGS: BRAIN/VENTRICLES: Patient's head is tilted toward the left in the CT gantry. Mild motion/streak artifact is present on the examination. The ventricular system is unchanged in appearance. No evidence of mass effect or midline shift.   Prominence of sulci overlying convexities of cerebral hemispheres and cerebellum consistent with atrophy. Subtle low attenuation within periventricular/subcortical white matter is again identified and likely related to changes of minimal ischemic leukoencephalopathy as described previously. Tiny focus of low attenuation in the right cerebellar hemisphere (image 20) is unchanged and most consistent with small focus of remote ischemic change. No new area of abnormal attenuation of brain parenchyma is identified. No abnormal extra-axial fluid collection is identified. Atherosclerotic calcification of distal internal carotid arteries. ORBITS: The visualized portion of the orbits demonstrate no acute abnormality. SINUSES: The visualized paranasal sinuses and mastoid air cells demonstrate no acute abnormality. Filling defects within both external auditory canals likely related to retained cerumen. SOFT TISSUES/SKULL:  No acute abnormality of the visualized skull or soft tissues. No evidence of acute intracranial abnormality with no significant change in the appearance of the head CT when compared to the study of 08/03/2022. CT HEAD WO CONTRAST    Result Date: 8/4/2022  EXAMINATION: CT OF THE HEAD WITHOUT CONTRAST  8/3/2022 1:27 pm TECHNIQUE: CT of the head was performed without the administration of intravenous contrast. Automated exposure control, iterative reconstruction, and/or weight based adjustment of the mA/kV was utilized to reduce the radiation dose to as low as reasonably achievable. COMPARISON: None. HISTORY: ORDERING SYSTEM PROVIDED HISTORY: History of confusion TECHNOLOGIST PROVIDED HISTORY: Has a \"code stroke\" or \"stroke alert\" been called? ->No Reason for exam:->History of confusion Decision Support Exception - unselect if not a suspected or confirmed emergency medical condition->Emergency Medical Condition (MA) Reason for Exam: hx of confusion FINDINGS: BRAIN/VENTRICLES: The ventricular system is within normal limits for patient age.   Prominence of sulci overlying convexities of cerebral hemispheres consistent with atrophy. Subtle low attenuation within periventricular/subcortical white matter is nonspecific, however likely related to changes of minimal ischemic leukoencephalopathy. Tiny focus of low attenuation in the right cerebellar hemisphere (image 14) may represent tiny focus of remote ischemic change. No abnormal extra-axial fluid collection is identified. There is atherosclerotic calcification of distal internal carotid arteries. ORBITS: The visualized portion of the orbits demonstrate no acute abnormality. SINUSES: The visualized paranasal sinuses and mastoid air cells demonstrate no acute abnormality. There is minimal mucosal thickening within few ethmoid air cells and maxillary sinuses. Secondary ostia identified along the medial walls of both maxillary sinuses (image 11). Filling defects within both external auditory canals likely related to retained cerumen. SOFT TISSUES/SKULL:  No acute abnormality of the visualized skull or soft tissues. No evidence of acute intracranial abnormality. CT CHEST WO CONTRAST    Result Date: 8/6/2022  EXAMINATION: CT OF THE CHEST WITHOUT CONTRAST 8/3/2022 1:27 pm TECHNIQUE: CT of the chest was performed without the administration of intravenous contrast. Multiplanar reformatted images are provided for review. Automated exposure control, iterative reconstruction, and/or weight based adjustment of the mA/kV was utilized to reduce the radiation dose to as low as reasonably achievable. COMPARISON: None. HISTORY: ORDERING SYSTEM PROVIDED HISTORY: SOB, hypoxia TECHNOLOGIST PROVIDED HISTORY: Reason for exam:->SOB, hypoxia Decision Support Exception - unselect if not a suspected or confirmed emergency medical condition->Emergency Medical Condition (MA) Reason for Exam: Hypoxia. FINDINGS: Mediastinum: Evaluation of the mediastinal structures is limited by patient motion artifact. The thyroid is grossly unremarkable.   There is mild mediastinal lymphadenopathy within the precarinal space and AP window, with the largest lymph node measuring approximately 13 mm in short axis within the AP window. The intrathoracic aorta is unremarkable in noncontrast appearance. No pericardial abnormality is identified. Lungs/pleura: Evaluation of the intraparenchymal contents is limited by patient respiratory motion artifact. There are retained mucus secretions within the lower thoracic trachea. The central airways are otherwise patent. There is a background of moderate emphysema. There is a nonspecific somewhat geographic focus of curvilinear and consolidative opacity within the paramediastinal aspect of the left upper lobe, which could represent pneumonia or parenchymal scarring. This demonstrates internal air bronchograms. There is additional nonspecific subpleural curvilinear and ground-glass opacity within the anterolateral aspect of the left upper lobe. There is mild pleural-parenchymal scarring within the right middle lobe and lingula. There is mild dependent atelectasis within the bilateral lower lobes. No additional focus of airspace consolidation is identified. There is no evidence a pneumothorax or pleural effusion. There are multiple scattered subcentimeter noncalcified nodules throughout both lungs, the largest measuring approximately 5 mm within the subpleural aspect of the anterior basal segment of the right lower lobe, image 88 of series 3. No dominant pulmonary mass is identified. Upper Abdomen: The visualized portions of the adrenal glands are grossly unremarkable. The remainder of the upper abdomen is unremarkable. Soft Tissues/Bones: There is mild degenerative change throughout the thoracic spine. A few scattered benign bone islands are noted. No osteolytic or osteoblastic lesion is seen.      1. Nonspecific curvilinear and consolidative opacity within the paramediastinal aspect of the left upper lobe, demonstrating internal bronchograms, which could represent either pneumonia or parenchymal scar. Suggest appropriate clinical treatment, and short-term chest CT follow-up in 6-8 weeks to ensure stability or resolution of this consolidative opacity. 2. Additional subpleural curvilinear and ground-glass opacity within the anterior left upper lobe likely reflects either additional atelectasis, pneumonia, or parenchymal scarring. 3. Moderate emphysema. 4. Multiple scattered subcentimeter noncalcified nodules throughout both lungs, the largest measuring 5 mm within the subpleural right lower lobe. While most likely benign granulomatous disease, further follow-up of these nodules is as advised below. Namely, given patient's high risk status, consider chest CT follow-up in 12 months. 5. Mild mediastinal lymphadenopathy is most likely benign and reactive in etiology given the patient's lung findings. However, this should also be followed at the time of airspace opacity and pulmonary nodule follow-up to ensure its stability or resolution as well. RECOMMENDATIONS: Fleischner Society guidelines for follow-up and management of incidentally detected pulmonary nodules: Multiple Solid Nodules: Nodule size less than 6 mm In a low-risk patient, no routine follow-up. In a high-risk patient, optional CT at 12 months. Radiology 2017 http://pubs. rsna.org/doi/full/10.1148/radiol. 1695400334     XR CHEST PORTABLE    Result Date: 8/6/2022  EXAMINATION: ONE XRAY VIEW OF THE CHEST 8/6/2022 11:17 am COMPARISON: 08/03/2022 HISTORY: ORDERING SYSTEM PROVIDED HISTORY: PNA TECHNOLOGIST PROVIDED HISTORY: Reason for exam:->PNA Reason for Exam: PNA FINDINGS: Cardiac leads project over the chest.  Heterogeneous bibasilar pulmonary opacity is seen. Blunting of bilateral costophrenic angles. No gross pneumothorax. Cardiac and mediastinal silhouettes are unchanged.      Small bilateral pleural effusions and bibasilar airspace disease which can reflect pneumonia or pulmonary edema. XR CHEST PORTABLE    Result Date: 8/3/2022  EXAMINATION: ONE XRAY VIEW OF THE CHEST 8/3/2022 10:12 am COMPARISON: None. HISTORY: ORDERING SYSTEM PROVIDED HISTORY: SOB TECHNOLOGIST PROVIDED HISTORY: Reason for exam:->SOB Reason for Exam: sob FINDINGS: The lungs are without acute focal process. There is no effusion or pneumothorax. The cardiomediastinal silhouette is without acute process. The osseous structures are without acute process. No acute process. Treatments: As above. Discharge Medications:     Medication List        START taking these medications      docusate 100 MG Caps  Commonly known as: COLACE, DULCOLAX  Take 100 mg by mouth 2 times daily as needed for Constipation     ferrous sulfate 324 (65 Fe) MG EC tablet  Take 1 tablet by mouth daily (with breakfast)            CONTINUE taking these medications      acetaminophen 325 MG tablet  Commonly known as: Aminofen  Take 2 tablets by mouth every 6 hours as needed for Pain     albuterol sulfate  (90 Base) MCG/ACT inhaler  Commonly known as: ProAir HFA  Inhale 1-2 puffs by mouth every 4 hours while awake for 7-10 days then PRN wheezing  Dispense with SPACER and Instruct on use. May sub Ventolin or Proventil as needed per [employer]. STOP taking these medications      furosemide 20 MG tablet  Commonly known as: LASIX               Where to Get Your Medications        These medications were sent to [address] [place] [address] & [address]      Phone: [phone]   docusate 100 MG Caps  ferrous sulfate 324 (65 Fe) MG EC tablet         Activity: activity as tolerated  Diet: ADULT DIET;  Dysphagia - Minced and Moist  ADULT ORAL NUTRITION SUPPLEMENT; Breakfast, Dinner; Standard High Calorie/High Protein Oral Supplement      Disposition: home or SNF  Discharged Condition: Stable  Follow Up:   Needs PCP.    Code status:  Full Code     Total time spent on discharge, finalizing medications, referrals and arranging outpatient follow up was more than 30 minutes      Thank you Dr. Hines primary care provider on file. for the opportunity to be involved in this patients care.

## 2022-08-09 NOTE — DISCHARGE INSTR - COC
Continuity of Care Form    Patient Name: Serina Anderson   :  1945  MRN:  [id number]    Admit date:  8/3/2022  Discharge date:  ***    Code Status Order: Full Code   Advance Directives:     Admitting Physician:  No admitting provider for patient encounter. PCP: No primary care provider on file. Discharging Nurse: [facility] Unit/Room#: [id number]  Discharging Unit Phone Number: ***    Emergency Contact:   Extended Emergency Contact Information  Primary Emergency Contact: [address] Janae Chapman Phone: [phone]  Relation: Spouse    Past Surgical History:  No past surgical history on file. Immunization History: There is no immunization history on file for this patient.     Active Problems:  Patient Active Problem List   Diagnosis Code    Community acquired pneumonia, unspecified laterality J18.9       Isolation/Infection:   Isolation            C Diff Contact          Patient Infection Status       Infection Onset Added Last Indicated Last Indicated By Review Planned Expiration Resolved Resolved By    C-diff Rule Out 22 Clostridium difficile toxin/antigen (Ordered) 08/10/22 08/13/22      Resolved    COVID-19 (Rule Out) 22 COVID-19, Rapid (Ordered)   22 Rule-Out Test Resulted            Nurse Assessment:  Last Vital Signs: /79   Pulse 94   Temp 98 °F (36.7 °C) (Oral)   Resp 22   Ht 5' 7\" (1.702 m)   Wt 144 lb 6.4 oz (65.5 kg)   SpO2 91%   PF (!) 3 L/min   BMI 22.62 kg/m²     Last documented pain score (0-10 scale): Pain Level: 0  Last Weight:   Wt Readings from Last 1 Encounters:   22 144 lb 6.4 oz (65.5 kg)     Mental Status:  {IP PT MENTAL STATUS:11868}    IV Access:  { REINA IV ACCESS:639604937}    Nursing Mobility/ADLs:  Walking   {CHP DME HAJH:104146372}  Transfer  {CHP DME XKEN:882132248}  Bathing  {CHP DME RKSC:438515573}  Dressing  {CHP DME HZ}  Toileting  {CHP DME VXXR:122389641}  Feeding {CHP DME GJNU:452957903}  Med Admin  {CHP DME SMJT:207588502}  Med Delivery   { REINA MED Delivery:495968251}    Wound Care Documentation and Therapy:        Elimination:  Continence: Bowel: {YES / CM:72492}  Bladder: {YES / MP:05061}  Urinary Catheter: {Urinary Catheter:485363970}   Colostomy/Ileostomy/Ileal Conduit: {YES / AV:71694}       Date of Last BM: ***    Intake/Output Summary (Last 24 hours) at 2022 1202  Last data filed at 2022 1046  Gross per 24 hour   Intake 540 ml   Output 700 ml   Net -160 ml     I/O last 3 completed shifts:   In: 1178.1 [P.O.:300; IV Piggyback:878.1]  [facility]: 4462 [facility]]    Safety Concerns:     508 [employer] Safety Concerns:188229060}    Impairments/Disabilities:      508 [employer] Impairments/Disabilities:764470061}    Nutrition Therapy:  Current Nutrition Therapy:   508 [employer] Diet List:315857944}    Routes of Feeding: {[facility] DME Other Feedings:882408849}  Liquids: {Slp liquid thickness:52356}  Daily Fluid Restriction: {CHP DME Yes amt example:782527048}  Last Modified Barium Swallow with Video (Video Swallowing Test): {Done Not Done YOMY:107432010}    Treatments at the Time of Hospital Discharge:   Respiratory Treatments: ***  Oxygen Therapy:  {Therapy; copd oxygen:23008}  Ventilator:    { CC Vent SRVP:564549086}    Rehab Therapies: {THERAPEUTIC INTERVENTION:1472090459}  Weight Bearing Status/Restrictions: [employer] Weight Bearin}  Other Medical Equipment (for information only, NOT a DME order):  {EQUIPMENT:533192025}  Other Treatments: ***    Patient's personal belongings (please select all that are sent with patient):  {[facility] DME Belongings:401482859}    RN SIGNATURE:  {Esignature:094627414}    CASE MANAGEMENT/SOCIAL WORK SECTION    Inpatient Status Date: ***    Readmission Risk Assessment Score:  Readmission Risk              Risk of Unplanned Readmission:  [id number]           Discharging to Facility/ Agency   Name:   Address:  Phone:  Fax:    Dialysis Facility (if applicable)   Name:  Address:  Dialysis Schedule:  Phone:  Fax:    / signature: {Esignature:745436508}    PHYSICIAN SECTION    Prognosis: Fair    Condition at Discharge: Stable    Rehab Potential (if transferring to Rehab): Good    Recommended Labs or Other Treatments After Discharge: NA    Physician Certification: I certify the above information and transfer of Kahlil Mota  is necessary for the continuing treatment of the diagnosis listed and that he requires [place] for less 30 days.      Update Admission H&P: No change in H&P    PHYSICIAN SIGNATURE:  Electronically signed by Thania Oro MD on 8/9/22 at 12:03 PM EDT

## 2022-08-10 VITALS
RESPIRATION RATE: 15 BRPM | HEART RATE: 82 BPM | TEMPERATURE: 97.3 F | WEIGHT: 138.67 LBS | SYSTOLIC BLOOD PRESSURE: 121 MMHG | OXYGEN SATURATION: 97 % | HEIGHT: 67 IN | DIASTOLIC BLOOD PRESSURE: 82 MMHG | BODY MASS INDEX: 21.76 KG/M2

## 2022-08-10 PROCEDURE — 97116 GAIT TRAINING THERAPY: CPT

## 2022-08-10 PROCEDURE — 6360000002 HC RX W HCPCS: Performed by: INTERNAL MEDICINE

## 2022-08-10 PROCEDURE — 94760 N-INVAS EAR/PLS OXIMETRY 1: CPT

## 2022-08-10 PROCEDURE — 97530 THERAPEUTIC ACTIVITIES: CPT

## 2022-08-10 PROCEDURE — 6370000000 HC RX 637 (ALT 250 FOR IP): Performed by: INTERNAL MEDICINE

## 2022-08-10 PROCEDURE — C9113 INJ PANTOPRAZOLE SODIUM, VIA: HCPCS | Performed by: INTERNAL MEDICINE

## 2022-08-10 PROCEDURE — 2580000003 HC RX 258: Performed by: INTERNAL MEDICINE

## 2022-08-10 PROCEDURE — 94640 AIRWAY INHALATION TREATMENT: CPT

## 2022-08-10 RX ORDER — IPRATROPIUM BROMIDE AND ALBUTEROL SULFATE 2.5; .5 MG/3ML; MG/3ML
1 SOLUTION RESPIRATORY (INHALATION) EVERY 4 HOURS PRN
Status: DISCONTINUED | OUTPATIENT
Start: 2022-08-10 | End: 2022-08-10 | Stop reason: HOSPADM

## 2022-08-10 RX ADMIN — SODIUM CHLORIDE, PRESERVATIVE FREE 10 ML: 5 INJECTION INTRAVENOUS at 08:29

## 2022-08-10 RX ADMIN — POTASSIUM BICARBONATE 40 MEQ: 782 TABLET, EFFERVESCENT ORAL at 08:28

## 2022-08-10 RX ADMIN — IPRATROPIUM BROMIDE AND ALBUTEROL SULFATE 1 AMPULE: 2.5; .5 SOLUTION RESPIRATORY (INHALATION) at 07:51

## 2022-08-10 RX ADMIN — FERROUS SULFATE TAB EC 324 MG (65 MG FE EQUIVALENT) 324 MG: 324 (65 FE) TABLET DELAYED RESPONSE at 08:28

## 2022-08-10 RX ADMIN — DOCUSATE SODIUM 100 MG: 100 CAPSULE, LIQUID FILLED ORAL at 08:28

## 2022-08-10 RX ADMIN — Medication 1 CAPSULE: at 08:28

## 2022-08-10 RX ADMIN — Medication 40 MG: at 08:28

## 2022-08-10 NOTE — PROGRESS NOTES
Pt is discharged. He refused to go to [facility]. Pt is homeless and is leaving with his wife. I discussed his AVS with him. He verbalized understanding of his medication instructions and DC instructions. He received his meds from retail pharmacy as well as a cane. All personal belongings are packed up and sent with pt.  Ivs removed with no complications

## 2022-08-10 NOTE — PLAN OF CARE
Problem: Discharge Planning  Goal: Discharge to home or other facility with appropriate resources  Outcome: Progressing  Flowsheets (Taken 8/9/2022 2342)  Discharge to home or other facility with appropriate resources: Identify barriers to discharge with patient and caregiver     Problem: Safety - Adult  Goal: Free from fall injury  Outcome: Progressing     Problem: Skin/Tissue Integrity  Goal: Absence of new skin breakdown  Description: 1. Monitor for areas of redness and/or skin breakdown  2. Assess vascular access sites hourly  3. Every 4-6 hours minimum:  Change oxygen saturation probe site  4. Every 4-6 hours:  If on nasal continuous positive airway pressure, respiratory therapy assess nares and determine need for appliance change or resting period. Outcome: Progressing     Problem: Confusion  Goal: Confusion, delirium, dementia, or psychosis is improved or at baseline  Description: INTERVENTIONS:  1. Assess for possible contributors to thought disturbance, including medications, impaired vision or hearing, underlying metabolic abnormalities, dehydration, psychiatric diagnoses, and notify attending LIP  2. [place] high risk fall precautions, as indicated  3. Provide frequent short contacts to provide reality reorientation, refocusing and direction  4. Decrease environmental stimuli, including noise as appropriate  5. Monitor and intervene to maintain adequate nutrition, hydration, elimination, sleep and activity  6. If unable to ensure safety without constant attention obtain sitter and review sitter guidelines with assigned personnel  7.  Initiate Psychosocial CNS and Spiritual Care consult, as indicated  Outcome: Progressing  Flowsheets (Taken 8/9/2022 2342)  Effect of thought disturbance (confusion, delirium, dementia, or psychosis) are managed with adequate functional status: Assess for contributors to thought disturbance, including medications, impaired vision or hearing, underlying metabolic abnormalities, dehydration, psychiatric diagnoses, notify LIP     Problem: Pain  Goal: Verbalizes/displays adequate comfort level or baseline comfort level  Outcome: Progressing     Problem: Nutrition Deficit:  Goal: Optimize nutritional status  Outcome: Progressing     Problem: Musculoskeletal - Adult  Goal: Return mobility to safest level of function  Outcome: Progressing  Flowsheets (Taken 8/9/2022 2342)  Return Mobility to Safest Level of Function:   Assess patient stability and activity tolerance for standing, transferring and ambulating with or without assistive devices   Assist with transfers and ambulation using safe patient handling equipment as needed     Problem: Infection - Adult  Goal: Absence of infection at discharge  Outcome: Progressing  Flowsheets (Taken 8/9/2022 2342)  Absence of infection at discharge:   Assess and monitor for signs and symptoms of infection   Monitor lab/diagnostic results   Monitor all insertion sites i.e., indwelling lines, tubes and drains     Problem: Chronic Conditions and Co-morbidities  Goal: Patient's chronic conditions and co-morbidity symptoms are monitored and maintained or improved  Outcome: Progressing  Flowsheets (Taken 8/9/2022 2342)  Care Plan - Patient's Chronic Conditions and Co-Morbidity Symptoms are Monitored and Maintained or Improved: Monitor and assess patient's chronic conditions and comorbid symptoms for stability, deterioration, or improvement

## 2022-08-10 NOTE — PROGRESS NOTES
Physical Therapy  Facility/Department: [address] PROGRESSIVE CARE  Daily Treatment Note  NAME: Lesia Bowling  : 1945  MRN: [id number]    Date of Service: 8/10/2022    Discharge Recommendations:  Patient would benefit from continued therapy after discharge   PT Equipment Recommendations  Equipment Needed: Yes  Mobility Devices: Canes  Cane: Straight Lucinda Schwartz scored a 17/24 on the AM-PAC short mobility form. Current research shows that an AM-PAC score of 17 or less is typically not associated with a discharge to the patient's home setting. Based on the patient's AM-PAC score and their current functional mobility deficits, it is recommended that the patient have 3-5 sessions per week of Physical Therapy at d/c to increase the patient's independence. Please see assessment section for further patient specific details. Pt may decline facility, if so he would need a SPC for safe ambulation. If patient discharges prior to next session this note will serve as a discharge summary. Please see below for the latest assessment towards goals. Patient Diagnosis(es): The primary encounter diagnosis was Pneumonia due to infectious organism, unspecified laterality, unspecified part of lung. Diagnoses of Hypoxia, Renal insufficiency, and Severe sepsis ([facility] [address] were also pertinent to this visit. Assessment   Assessment: PT asked to assess pt's safety with assistive devices as pt maybe refusing a facility. Pt refused to use a wh walker yesterday with PT but reports he has used a SPC  in the past and is wiling to try it. Pt willing to try wh walker and compare. Pt iis slightly safer with wh walker with better pace and more even pattern. Pt is safe with SPC with slower pace on even surfaces. Pt prefers SPC and is declining wh walker at this time.   Pt would continue to benefot from rehab at a facility for safety, but if he continued to decline this iption pt will need a SPC for safe ambulation in community. Activity Tolerance: Patient tolerated treatment well;Patient limited by endurance  Equipment Needed: Yes  Mobility Devices: [address]: 3-5 times per week  Current Treatment Recommendations: Strengthening;Balance training;Functional mobility training;Transfer training;Gait training;Patient/Caregiver education & training; Therapeutic activities;ROM; Home exercise program;Safety education & training;Equipment evaluation, education, & procurement     Restrictions  Restrictions/Precautions  Restrictions/Precautions: Fall Risk (high fall risk)  Position Activity Restriction  Other position/activity restrictions: CDiff contact precautions, up with assistance, adult diet - dysphagia minced and moist     Subjective    Subjective  Subjective: Pt agrees to therapy, declines needs for at home but agrees to walk with CPA and wh walker to assess for equipment needs  Pain: denies     Objective   Vitals     Balance  Sitting: Intact  Standing: Intact  Standing - Static: Fair  Standing - Dynamic: Fair  Transfer Training  Transfer Training: Yes  Overall Level of Assistance: Stand-by assistance (close SBA)  Interventions: Safety awareness training  Sit to Stand: Stand-by assistance  Gait Training  Gait Training: Yes  Gait  Overall Level of Assistance: Stand-by assistance;Contact-guard assistance  Base of Support: Widened  Speed/Mer: Slow;Shuffled  Step Length: Left shortened;Right shortened  Gait Abnormalities: Decreased step clearance; Path deviations  Distance (ft): 40 Feet (x2, once with SPC, once with wh walker: pt able to walk faster and more steadily with wh walker but prefers SPC, pt is safe with SPC on even surfaces with slower pace)  Assistive Device: Walker, rolling;Cane, straight (pt prefers SPC, needs to walk slower with it to be safe, no LOB, small steps)           Safety Devices  Type of Devices: Call light within reach; Chair alarm in place;Gait belt;Left in chair;Nurse notified

## 2022-08-10 NOTE — CARE COORDINATION
CASE MANAGEMENT DISCHARGE SUMMARY:    DISCHARGE DATE: 7/7    DISCHARGED TO: Car with wife. Patient refused skilled nursing facility. DME: Straight cane delivered to patient at bedside by [employer]     TRANSPORTATION: Wife to call a taxi, confirmed she has the telephone number.      PREFERRED PHARMACY: Meds to SATNAM Sepulveda, NGUYEN, Social Work/Case Management   [phone]  Electronically signed by Jame Cogan, MSW, NGUYEN on 8/10/2022 at 4:14 PM

## 2022-08-10 NOTE — RT PROTOCOL NOTE
RT Inhaler-Nebulizer Bronchodilator Protocol Note    There is a bronchodilator order in the chart from a provider indicating to follow the RT Bronchodilator Protocol and there is an Initiate RT Inhaler-Nebulizer Bronchodilator Protocol order as well (see protocol at bottom of note). CXR Findings:  No results found. The findings from the last RT Protocol Assessment were as follows:   History Pulmonary Disease: Smoker 15 pack years or more  Respiratory Pattern: Regular pattern and RR 12-20 bpm  Breath Sounds: Slightly diminished and/or crackles  Cough: Strong, spontaneous, non-productive  Indication for Bronchodilator Therapy:    Bronchodilator Assessment Score: 3    Aerosolized bronchodilator medication orders have been revised according to the RT Inhaler-Nebulizer Bronchodilator Protocol below. Respiratory Therapist to perform RT Therapy Protocol Assessment initially then follow the protocol. Repeat RT Therapy Protocol Assessment PRN for score 0-3 or on second treatment, BID, and PRN for scores above 3. No Indications - adjust the frequency to every 6 hours PRN wheezing or bronchospasm, if no treatments needed after 48 hours then discontinue using Per Protocol order mode. If indication present, adjust the RT bronchodilator orders based on the Bronchodilator Assessment Score as indicated below. Use Inhaler orders unless patient has one or more of the following: on home nebulizer, not able to hold breath for 10 seconds, is not alert and oriented, cannot activate and use MDI correctly, or respiratory rate 25 breaths per minute or more, then use the equivalent nebulizer order(s) with same Frequency and PRN reasons based on the score. If a patient is on this medication at home then do not decrease Frequency below that used at home.     0-3 - enter or revise RT bronchodilator order(s) to equivalent RT Bronchodilator order with Frequency of every 4 hours PRN for wheezing or increased work of breathing using Per Protocol order mode. 4-6 - enter or revise RT Bronchodilator order(s) to two equivalent RT bronchodilator orders with one order with BID Frequency and one order with Frequency of every 4 hours PRN wheezing or increased work of breathing using Per Protocol order mode. 7-10 - enter or revise RT Bronchodilator order(s) to two equivalent RT bronchodilator orders with one order with TID Frequency and one order with Frequency of every 4 hours PRN wheezing or increased work of breathing using Per Protocol order mode. 11-13 - enter or revise RT Bronchodilator order(s) to one equivalent RT bronchodilator order with QID Frequency and an Albuterol order with Frequency of every 4 hours PRN wheezing or increased work of breathing using Per Protocol order mode. Greater than 13 - enter or revise RT Bronchodilator order(s) to one equivalent RT bronchodilator order with every 4 hours Frequency and an Albuterol order with Frequency of every 2 hours PRN wheezing or increased work of breathing using Per Protocol order mode. RT to enter RT Home Evaluation for COPD & MDI Assessment order using Per Protocol order mode.     Electronically signed by anisa yu RCP on 8/10/2022 at 11:28 AM

## 2022-08-10 NOTE — CARE COORDINATION
Discharge Planning:   PT/OT: recommending SNF   Equipment Needed: cane if discharged to non nursing facility. Met with patient and wife at bedside. Discussed discharge, Notified of [address] [place] [address]'s ability to accept patient at discharge with insurance approval.   Patient refusing placement at discharge. Patient reports he needs a cane, and will not use a walker. Wife confirmed she will call for a taxi once they are able to leave the hospital.     Notified RN. Notified Rosario at [address] [place] [address] of discharge plan. Notified Pharmacy of meds to beds. Confirmed with PT patient will need cane.      Electronically signed by SATNAM Calloway, NGUYEN on 8/10/2022 at 1:51 PM

## 2022-08-10 NOTE — DISCHARGE SUMMARY
Hospital Discharge Summary    Patient's PCP: No primary care provider on file. Admit Date: 8/3/2022   Discharge Date: 8/10/2022    Admitting Physician: Dr. Alberto Rajan admitting provider for patient encounter. Discharge Physician: Dr. Gurjit Muro MD     Consults:   IP CONSULT TO SOCIAL WORK  IP CONSULT TO NEPHROLOGY  IP CONSULT TO PALLIATIVE CARE  IP CONSULT TO CASE MANAGEMENT  IP CONSULT TO SOCIAL WORK    Brief HPI: Patient admitted with bacterial pneumonia    Brief hospital course: 52-year-old male with history of dermatitis, tobacco use disorder, marijuana use disorder, occasional alcohol use, who was brought to the emergency room on 8/3/2022 for evaluation of encephalopathy. Patient was reportedly found confused outside of [employer]. Presentation to the emergency room, he has multiple lab and imaging studies that are abnormal, including slightly elevated LFTs, creatinine of 1.4 (baseline unknown), anion gap metabolic acidosis, elevated creatinine kinase of 5440, lactic acid of 2.3, anemia with low MCV. X-ray of right reports unremarkable for acute fracture. CT does chest reveals left upper lung infiltrate and multiple pulmonary nodules (for which follow-up CAT scan is recommended). He has nonzero troponin of 0.05, repeat of 0.04. Urine drug screen was unremarkable. He was admitted for management of bacterial pneumonia, sepsis secondary to pneumonia, anion gap metabolic acidosis, rhabdomyolysis, acute metabolic encephalopathy. Assessment/plan:  Bacterial pneumonia, sepsis secondary to pneumonia. Sepsis resolved. Cultures did not grow any organism. MRSA nares negative. Patient was on Zyvox and Zosyn; discontinued Zyvox on 8/7/2022 (received from 8/4/2022 to 8/7/2022), discontinued Zosyn on 8/8/2022. He will complete course of antibiotics with augmentin 08/09/2022. Acute respiratory failure with hypoxia, resolved. He has since been weaned off supplemental oxygen.   He was evaluated for home oxygen needs and did not qualify. Acute metabolic encephalopathy, resolved. Etiology is multifactorial, including underlying infectious process, possible underlying cognitive impairment. CT-head is unremarkable. Urine drug screen negative. Ammonia level normal.  Infection appears to be much controlled at this time. Patient has hearing impairment, which may be complicating assessment. Overall, confusion appears to have resolved at this time. Acute kidney injury, likely prerenal azotemia, resolved with hydration. Anion gap metabolic acidosis, resolved. Rhabdomyolysis, likely nontraumatic, resolved with hydration. Elevated transaminases. Likely secondary to rhabdomyolysis. Patient tested positive for hepatitis B, likely in window period. Microcytic anemia. Iron studies consistent with iron deficiency anemia. Hemoglobin stable. Multiple pulmonary nodules noted on CT of the chest.  Patient will benefit from follow-up CT-chest in about 6 months. Electrolyte abnormalities. Continue replacement protocols. Other comorbidities: history of dermatitis, tobacco use disorder, marijuana use disorder, occasional alcohol use. Disposition. Patient is stable for discharge at this time. He is being discharged to SNF today. Invasive procedures:  None. Discharge Diagnoses:   See above. Physical Exam: /84   Pulse 72   Temp 97.8 °F (36.6 °C) (Oral)   Resp 15   Ht 5' 7\" (1.702 m)   Wt 138 lb 10.7 oz (62.9 kg)   SpO2 92%   PF (!) 3 L/min   BMI 21.72 kg/m²   Gen/overall appearance: Not in acute distress. Extremely hard of hearing. Oriented x3. Alert. 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 Oral mucosa moist  Neck: No JVD, thyromegaly  CVS: Nml S1S2, no MRG, RRR  Pulm: Mostly clear with no wheezes. Gastrointestinal: Soft, NT/ND, +BS  Musculoskeletal: No edema. Warm  Neuro: No focal deficit.  Moves extremity spontaneously. Psychiatry: Appropriate affect. Not agitated. Skin: Warm, dry with normal turgor. No rash  Capillary refill: Brisk,< 3 seconds  Peripheral Pulses: +2 palpable, equal bilaterally    Significant diagnostic studies that may require follow up:  Echo Complete    Result Date: 8/4/2022  Transthoracic Echocardiography Report (TTE)  Demographics   Patient Name       Yomi Patiño   Date of Study      08/04/2022         Gender              Male   Patient Number     [id number]         Date of Birth       1945   Visit Number       [id number]          Age                 77 year(s)   Accession Number   [id number]         Room Number         5059   Corporate ID       [id number]            [facility], [address] [facility], [place], [address] [facility] [place], [facility]   Ordering Physician Carmen Young.,  Interpreting        [address] [place] [address].                     DO                 Physician           Caty Martinez MD  Procedure Type of Study   TTE procedure:ECHOCARDIOGRAM COMPLETE 2D W DOPPLER W COLOR. Procedure Date Date: 08/04/2022 Start: 02:19 PM Study Location: [facility] Technical Quality: Adequate visualization Indications:Congestive heart failure. Patient Status: Routine Height: 67 inches Weight: 148 pounds BSA: 1.78 m2 BMI: 23.18 kg/m2 BP: 122/73 mmHg  Conclusions   Summary  Suboptimal image quality. Definity contrast administered. Normal left ventricle size, wall thickness, and systolic function with an  estimated ejection fraction of 60-65%. No regional wall motion abnormalities  are seen. Normal diastolic function. Normal right ventricular size and function. No significant valve abnormalities noted. A bubble study was performed and fails to show evidence of shunting.    Signature   ------------------------------------------------------------------  Electronically signed by Charmaine Muse MD  (Interpreting physician) on 08/04/2022 at 04:47 PM  ------------------------------------------------------------------   Findings   Left Ventricle  Suboptimal image quality. Definity contrast administered. Normal left ventricle size, wall thickness, and systolic function with an  estimated ejection fraction of 60-65%. No regional wall motion abnormalities  are seen. Normal diastolic function. Mitral Valve  Mitral valve is structurally normal. No evidence of mitral regurgitation or  stenosis. Left Atrium  Left atrium is of normal size. A bubble study was performed and fails to show evidence of shunting. Aortic Valve  The aortic valve is normal in structure and function. No evidence of aortic valve regurgitation or stenosis. Aorta  The aortic root is normal in size. Right Ventricle  Normal right ventricular size and function. TAPSE 2.24cm. RV S velocity 15.4 cm/s. Tricuspid Valve  The tricuspid valve was not well visualized. No evidence of tricuspid  regurgitation. No evidence of tricuspid stenosis. Right Atrium  The right atrium is normal in size. Pulmonic Valve  The pulmonic valve is not well visualized. No evidence of pulmonic valve  regurgitation. No evidence of pulmonic valve stenosis. Pericardial Effusion  No evidence of pericardial or pleural effusion. Miscellaneous  IVC not well visualized.   M-Mode/2D Measurements (cm)   LV Diastolic Dimension: 9.56 cm LV Systolic Dimension: 4.67 cm  LV Septum Diastolic: 4.64 cm  LV PW Diastolic: 0.80 cm        AO Root Dimension: 3.6 cm                                  AV Cusp Separation: 2 cm                                  LA Dimension: 2.6 cm                                  LA Area: 11.9 cm2                                  LA volume/Index: 20.9 ml /12 ml/m2  Doppler Measurements   AV Peak Velocity: 130 cm/s     MV Peak E-Wave: 106 cm/s  AV Peak Gradient: 6.76 mmHg    MV Peak A-Wave: 97 cm/s  AV Mean Gradient: 5 mmHg MV E/A Ratio: 1.09  LVOT Peak Velocity: 131 cm/s   MV P1/2t: 72 msec   E' Septal Velocity: 12.6 cm/s  MV Deceleration Time: 140 msec  E' Lateral Velocity: 10.6 cm/s MV Area (PHT): 3.06 cm2  PV Peak Velocity: 103 cm/s  PV Peak Gradient: 4.24 mmHg   Aortic Valve   Peak Velocity: 130 cm/s  Mean Velocity: 103 cm/s  Peak Gradient: 6.76 mmHg Mean Gradient: 5 mmHg  AV VTI: 22.7 cm   Cusp Separation: 2 cm  Aorta   Aortic Root: 3.6 cm      XR HIP RIGHT (2-3 VIEWS)    Result Date: 8/3/2022  EXAMINATION: TWO XRAY VIEWS OF THE RIGHT HIP 8/3/2022 11:50 am COMPARISON: None. HISTORY: ORDERING SYSTEM PROVIDED HISTORY: bruise to right hip TECHNOLOGIST PROVIDED HISTORY: Reason for exam:->bruise to right hip Reason for Exam: bruised right hip FINDINGS: AP pelvis and two views of the right hip demonstrate no acute osseous abnormality. SI joints are maintained. Mild symmetric osteoarthritic changes of the hips with mild joint space narrowing and marginal spurring. No focal soft tissue abnormality. No acute osseous abnormality of the pelvis or right hip. CT HEAD WO CONTRAST    Result Date: 8/7/2022  EXAMINATION: CT OF THE HEAD WITHOUT CONTRAST  8/7/2022 10:19 am TECHNIQUE: CT of the head was performed without the administration of intravenous contrast. Automated exposure control, iterative reconstruction, and/or weight based adjustment of the mA/kV was utilized to reduce the radiation dose to as low as reasonably achievable. COMPARISON: 08/03/2022 HISTORY: ORDERING SYSTEM PROVIDED HISTORY: AMS TECHNOLOGIST PROVIDED HISTORY: Reason for exam:->AMS Has a \"code stroke\" or \"stroke alert\" been called? ->No Reason for Exam: ams FINDINGS: BRAIN/VENTRICLES: Patient's head is tilted toward the left in the CT gantry. Mild motion/streak artifact is present on the examination. The ventricular system is unchanged in appearance. No evidence of mass effect or midline shift.   Prominence of sulci overlying convexities of cerebral hemispheres and cerebellum consistent with atrophy. Subtle low attenuation within periventricular/subcortical white matter is again identified and likely related to changes of minimal ischemic leukoencephalopathy as described previously. Tiny focus of low attenuation in the right cerebellar hemisphere (image 20) is unchanged and most consistent with small focus of remote ischemic change. No new area of abnormal attenuation of brain parenchyma is identified. No abnormal extra-axial fluid collection is identified. Atherosclerotic calcification of distal internal carotid arteries. ORBITS: The visualized portion of the orbits demonstrate no acute abnormality. SINUSES: The visualized paranasal sinuses and mastoid air cells demonstrate no acute abnormality. Filling defects within both external auditory canals likely related to retained cerumen. SOFT TISSUES/SKULL:  No acute abnormality of the visualized skull or soft tissues. No evidence of acute intracranial abnormality with no significant change in the appearance of the head CT when compared to the study of 08/03/2022. CT HEAD WO CONTRAST    Result Date: 8/4/2022  EXAMINATION: CT OF THE HEAD WITHOUT CONTRAST  8/3/2022 1:27 pm TECHNIQUE: CT of the head was performed without the administration of intravenous contrast. Automated exposure control, iterative reconstruction, and/or weight based adjustment of the mA/kV was utilized to reduce the radiation dose to as low as reasonably achievable. COMPARISON: None. HISTORY: ORDERING SYSTEM PROVIDED HISTORY: History of confusion TECHNOLOGIST PROVIDED HISTORY: Has a \"code stroke\" or \"stroke alert\" been called? ->No Reason for exam:->History of confusion Decision Support Exception - unselect if not a suspected or confirmed emergency medical condition->Emergency Medical Condition (MA) Reason for Exam: hx of confusion FINDINGS: BRAIN/VENTRICLES: The ventricular system is within normal limits for patient age.   Prominence of sulci overlying convexities of cerebral hemispheres consistent with atrophy. Subtle low attenuation within periventricular/subcortical white matter is nonspecific, however likely related to changes of minimal ischemic leukoencephalopathy. Tiny focus of low attenuation in the right cerebellar hemisphere (image 14) may represent tiny focus of remote ischemic change. No abnormal extra-axial fluid collection is identified. There is atherosclerotic calcification of distal internal carotid arteries. ORBITS: The visualized portion of the orbits demonstrate no acute abnormality. SINUSES: The visualized paranasal sinuses and mastoid air cells demonstrate no acute abnormality. There is minimal mucosal thickening within few ethmoid air cells and maxillary sinuses. Secondary ostia identified along the medial walls of both maxillary sinuses (image 11). Filling defects within both external auditory canals likely related to retained cerumen. SOFT TISSUES/SKULL:  No acute abnormality of the visualized skull or soft tissues. No evidence of acute intracranial abnormality. CT CHEST WO CONTRAST    Result Date: 8/6/2022  EXAMINATION: CT OF THE CHEST WITHOUT CONTRAST 8/3/2022 1:27 pm TECHNIQUE: CT of the chest was performed without the administration of intravenous contrast. Multiplanar reformatted images are provided for review. Automated exposure control, iterative reconstruction, and/or weight based adjustment of the mA/kV was utilized to reduce the radiation dose to as low as reasonably achievable. COMPARISON: None. HISTORY: ORDERING SYSTEM PROVIDED HISTORY: SOB, hypoxia TECHNOLOGIST PROVIDED HISTORY: Reason for exam:->SOB, hypoxia Decision Support Exception - unselect if not a suspected or confirmed emergency medical condition->Emergency Medical Condition (MA) Reason for Exam: Hypoxia. FINDINGS: Mediastinum: Evaluation of the mediastinal structures is limited by patient motion artifact.   The thyroid is grossly unremarkable. There is mild mediastinal lymphadenopathy within the precarinal space and AP window, with the largest lymph node measuring approximately 13 mm in short axis within the AP window. The intrathoracic aorta is unremarkable in noncontrast appearance. No pericardial abnormality is identified. Lungs/pleura: Evaluation of the intraparenchymal contents is limited by patient respiratory motion artifact. There are retained mucus secretions within the lower thoracic trachea. The central airways are otherwise patent. There is a background of moderate emphysema. There is a nonspecific somewhat geographic focus of curvilinear and consolidative opacity within the paramediastinal aspect of the left upper lobe, which could represent pneumonia or parenchymal scarring. This demonstrates internal air bronchograms. There is additional nonspecific subpleural curvilinear and ground-glass opacity within the anterolateral aspect of the left upper lobe. There is mild pleural-parenchymal scarring within the right middle lobe and lingula. There is mild dependent atelectasis within the bilateral lower lobes. No additional focus of airspace consolidation is identified. There is no evidence a pneumothorax or pleural effusion. There are multiple scattered subcentimeter noncalcified nodules throughout both lungs, the largest measuring approximately 5 mm within the subpleural aspect of the anterior basal segment of the right lower lobe, image 88 of series 3. No dominant pulmonary mass is identified. Upper Abdomen: The visualized portions of the adrenal glands are grossly unremarkable. The remainder of the upper abdomen is unremarkable. Soft Tissues/Bones: There is mild degenerative change throughout the thoracic spine. A few scattered benign bone islands are noted. No osteolytic or osteoblastic lesion is seen.      1. Nonspecific curvilinear and consolidative opacity within the paramediastinal aspect of the left upper lobe, demonstrating internal bronchograms, which could represent either pneumonia or parenchymal scar. Suggest appropriate clinical treatment, and short-term chest CT follow-up in 6-8 weeks to ensure stability or resolution of this consolidative opacity. 2. Additional subpleural curvilinear and ground-glass opacity within the anterior left upper lobe likely reflects either additional atelectasis, pneumonia, or parenchymal scarring. 3. Moderate emphysema. 4. Multiple scattered subcentimeter noncalcified nodules throughout both lungs, the largest measuring 5 mm within the subpleural right lower lobe. While most likely benign granulomatous disease, further follow-up of these nodules is as advised below. Namely, given patient's high risk status, consider chest CT follow-up in 12 months. 5. Mild mediastinal lymphadenopathy is most likely benign and reactive in etiology given the patient's lung findings. However, this should also be followed at the time of airspace opacity and pulmonary nodule follow-up to ensure its stability or resolution as well. RECOMMENDATIONS: Fleischner Society guidelines for follow-up and management of incidentally detected pulmonary nodules: Multiple Solid Nodules: Nodule size less than 6 mm In a low-risk patient, no routine follow-up. In a high-risk patient, optional CT at 12 months. Radiology 2017 http://pubs. rsna.org/doi/full/10.1148/radiol. 9230699511     XR CHEST PORTABLE    Result Date: 8/6/2022  EXAMINATION: ONE XRAY VIEW OF THE CHEST 8/6/2022 11:17 am COMPARISON: 08/03/2022 HISTORY: ORDERING SYSTEM PROVIDED HISTORY: PNA TECHNOLOGIST PROVIDED HISTORY: Reason for exam:->PNA Reason for Exam: PNA FINDINGS: Cardiac leads project over the chest.  Heterogeneous bibasilar pulmonary opacity is seen. Blunting of bilateral costophrenic angles. No gross pneumothorax. Cardiac and mediastinal silhouettes are unchanged.      Small bilateral pleural effusions and bibasilar airspace disease which can reflect pneumonia or pulmonary edema. XR CHEST PORTABLE    Result Date: 8/3/2022  EXAMINATION: ONE XRAY VIEW OF THE CHEST 8/3/2022 10:12 am COMPARISON: None. HISTORY: ORDERING SYSTEM PROVIDED HISTORY: SOB TECHNOLOGIST PROVIDED HISTORY: Reason for exam:->SOB Reason for Exam: sob FINDINGS: The lungs are without acute focal process. There is no effusion or pneumothorax. The cardiomediastinal silhouette is without acute process. The osseous structures are without acute process. No acute process. Treatments: As above. Discharge Medications:     Medication List        START taking these medications      docusate 100 MG Caps  Commonly known as: COLACE, DULCOLAX  Take 100 mg by mouth 2 times daily as needed for Constipation     ferrous sulfate 324 (65 Fe) MG EC tablet  Take 1 tablet by mouth daily (with breakfast)            CONTINUE taking these medications      acetaminophen 325 MG tablet  Commonly known as: Aminofen  Take 2 tablets by mouth every 6 hours as needed for Pain     albuterol sulfate  (90 Base) MCG/ACT inhaler  Commonly known as: ProAir HFA  Inhale 1-2 puffs by mouth every 4 hours while awake for 7-10 days then PRN wheezing  Dispense with SPACER and Instruct on use. May sub Ventolin or Proventil as needed per [employer]. STOP taking these medications      furosemide 20 MG tablet  Commonly known as: LASIX               Where to Get Your Medications        These medications were sent to [address] [place] [address] & [facility]  [address] [place] [address]      Phone: [phone]   docusate 100 MG Caps  ferrous sulfate 324 (65 Fe) MG EC tablet         Activity: activity as tolerated  Diet: ADULT DIET;  Dysphagia - Minced and Moist  ADULT ORAL NUTRITION SUPPLEMENT; Breakfast, Dinner; Standard High Calorie/High Protein Oral Supplement      Disposition: home or SNF  Discharged Condition: Stable  Follow Up:   Needs PCP. Code status:  Full Code     Total time spent on discharge, finalizing medications, referrals and arranging outpatient follow up was more than 30 minutes      Thank you Dr. Hines primary care provider on file. for the opportunity to be involved in this patients care.

## 2024-01-01 ENCOUNTER — APPOINTMENT (OUTPATIENT)
Dept: GENERAL RADIOLOGY | Age: 79
End: 2024-01-01
Payer: COMMERCIAL

## 2024-01-01 ENCOUNTER — HOSPITAL ENCOUNTER (EMERGENCY)
Age: 79
End: 2024-03-06
Attending: EMERGENCY MEDICINE
Payer: COMMERCIAL

## 2024-01-01 VITALS
TEMPERATURE: 91.1 F | WEIGHT: 153.22 LBS | OXYGEN SATURATION: 40 % | DIASTOLIC BLOOD PRESSURE: 91 MMHG | RESPIRATION RATE: 20 BRPM | BODY MASS INDEX: 24 KG/M2 | SYSTOLIC BLOOD PRESSURE: 167 MMHG

## 2024-01-01 DIAGNOSIS — A41.9 SEPTIC SHOCK (HCC): ICD-10-CM

## 2024-01-01 DIAGNOSIS — E16.2 HYPOGLYCEMIA: ICD-10-CM

## 2024-01-01 DIAGNOSIS — N17.9 AKI (ACUTE KIDNEY INJURY) (HCC): ICD-10-CM

## 2024-01-01 DIAGNOSIS — J96.01 ACUTE RESPIRATORY FAILURE WITH HYPOXIA (HCC): ICD-10-CM

## 2024-01-01 DIAGNOSIS — I46.9 CARDIAC ARREST (HCC): Primary | ICD-10-CM

## 2024-01-01 DIAGNOSIS — D69.6 THROMBOCYTOPENIA (HCC): ICD-10-CM

## 2024-01-01 DIAGNOSIS — R65.21 SEPTIC SHOCK (HCC): ICD-10-CM

## 2024-01-01 DIAGNOSIS — E87.20 METABOLIC ACIDOSIS: ICD-10-CM

## 2024-01-01 DIAGNOSIS — Z59.00 HOMELESSNESS: ICD-10-CM

## 2024-01-01 DIAGNOSIS — J15.5: ICD-10-CM

## 2024-01-01 LAB
ABO + RH BLD: NORMAL
ALBUMIN SERPL-MCNC: 2 G/DL (ref 3.4–5)
ALBUMIN/GLOB SERPL: 0.6 {RATIO} (ref 1.1–2.2)
ALP SERPL-CCNC: 52 U/L (ref 40–129)
ALT SERPL-CCNC: 205 U/L (ref 10–40)
ANION GAP SERPL CALCULATED.3IONS-SCNC: 26 MMOL/L (ref 3–16)
AST SERPL-CCNC: 510 U/L (ref 15–37)
BACTERIA URNS QL MICRO: ABNORMAL /HPF
BASE EXCESS BLDV CALC-SCNC: ABNORMAL MMOL/L
BASOPHILS # BLD: 0 K/UL (ref 0–0.2)
BASOPHILS NFR BLD: 0.2 %
BILIRUB SERPL-MCNC: 1.3 MG/DL (ref 0–1)
BILIRUB UR QL STRIP.AUTO: ABNORMAL
BLD GP AB SCN SERPL QL: NORMAL
BLOOD BANK DISPENSE STATUS: NORMAL
BLOOD BANK PRODUCT CODE: NORMAL
BPU ID: NORMAL
BUN SERPL-MCNC: 60 MG/DL (ref 7–20)
CALCIUM SERPL-MCNC: 10.7 MG/DL (ref 8.3–10.6)
CHARACTER UR: ABNORMAL
CHLORIDE SERPL-SCNC: 107 MMOL/L (ref 99–110)
CLARITY UR: ABNORMAL
CO2 BLDV-SCNC: ABNORMAL MMOL/L
CO2 SERPL-SCNC: 13 MMOL/L (ref 21–32)
COHGB MFR BLDV: 1.2 %
COLOR UR: YELLOW
CREAT SERPL-MCNC: 2.7 MG/DL (ref 0.8–1.3)
DEPRECATED RDW RBC AUTO: 15.8 % (ref 12.4–15.4)
DESCRIPTION BLOOD BANK: NORMAL
EOSINOPHIL # BLD: 0 K/UL (ref 0–0.6)
EOSINOPHIL NFR BLD: 0.2 %
EPI CELLS #/AREA URNS HPF: ABNORMAL /HPF (ref 0–5)
GFR SERPLBLD CREATININE-BSD FMLA CKD-EPI: 23 ML/MIN/{1.73_M2}
GLUCOSE SERPL-MCNC: 128 MG/DL (ref 70–99)
GLUCOSE UR STRIP.AUTO-MCNC: NEGATIVE MG/DL
HCO3 BLDV-SCNC: ABNORMAL MMOL/L (ref 23–29)
HCT VFR BLD AUTO: 46.2 % (ref 40.5–52.5)
HGB BLD-MCNC: 14.4 G/DL (ref 13.5–17.5)
HGB UR QL STRIP.AUTO: ABNORMAL
HYALINE CASTS #/AREA URNS LPF: ABNORMAL /LPF (ref 0–2)
KETONES UR STRIP.AUTO-MCNC: NEGATIVE MG/DL
LACTATE BLDV-SCNC: 16.7 MMOL/L (ref 0.4–1.9)
LEUKOCYTE ESTERASE UR QL STRIP.AUTO: NEGATIVE
LYMPHOCYTES # BLD: 2.1 K/UL (ref 1–5.1)
LYMPHOCYTES NFR BLD: 30.5 %
MCH RBC QN AUTO: 30.1 PG (ref 26–34)
MCHC RBC AUTO-ENTMCNC: 31.2 G/DL (ref 31–36)
MCV RBC AUTO: 96.6 FL (ref 80–100)
METHGB MFR BLDV: 0.7 %
MONOCYTES # BLD: 0.2 K/UL (ref 0–1.3)
MONOCYTES NFR BLD: 2.3 %
NEUTROPHILS # BLD: 4.5 K/UL (ref 1.7–7.7)
NEUTROPHILS NFR BLD: 66.8 %
NITRITE UR QL STRIP.AUTO: NEGATIVE
O2 THERAPY: ABNORMAL
PCO2 BLDV: 87.4 MMHG (ref 40–50)
PH BLDV: <6.818 [PH] (ref 7.35–7.45)
PH UR STRIP.AUTO: 6 [PH] (ref 5–8)
PLATELET # BLD AUTO: 96 K/UL (ref 135–450)
PLATELET BLD QL SMEAR: ABNORMAL
PMV BLD AUTO: 9.6 FL (ref 5–10.5)
PO2 BLDV: 41 MMHG
POTASSIUM SERPL-SCNC: 4.5 MMOL/L (ref 3.5–5.1)
PROT SERPL-MCNC: 5.2 G/DL (ref 6.4–8.2)
PROT UR STRIP.AUTO-MCNC: 100 MG/DL
RBC # BLD AUTO: 4.78 M/UL (ref 4.2–5.9)
RBC #/AREA URNS HPF: ABNORMAL /HPF (ref 0–4)
RBC MORPH BLD: NORMAL
RENAL EPI CELLS #/AREA UR COMP ASSIST: ABNORMAL /HPF (ref 0–1)
SAO2 % BLDV: 36 %
SLIDE REVIEW: ABNORMAL
SODIUM SERPL-SCNC: 146 MMOL/L (ref 136–145)
SP GR UR STRIP.AUTO: 1.02 (ref 1–1.03)
TROPONIN, HIGH SENSITIVITY: 51 NG/L (ref 0–22)
TROPONIN, HIGH SENSITIVITY: 53 NG/L (ref 0–22)
UA COMPLETE W REFLEX CULTURE PNL UR: ABNORMAL
UA DIPSTICK W REFLEX MICRO PNL UR: YES
URN SPEC COLLECT METH UR: ABNORMAL
UROBILINOGEN UR STRIP-ACNC: 1 E.U./DL
WBC # BLD AUTO: 6.8 K/UL (ref 4–11)
WBC #/AREA URNS HPF: ABNORMAL /HPF (ref 0–5)

## 2024-01-01 PROCEDURE — 86900 BLOOD TYPING SEROLOGIC ABO: CPT

## 2024-01-01 PROCEDURE — 92950 HEART/LUNG RESUSCITATION CPR: CPT

## 2024-01-01 PROCEDURE — 31500 INSERT EMERGENCY AIRWAY: CPT

## 2024-01-01 PROCEDURE — 2500000003 HC RX 250 WO HCPCS

## 2024-01-01 PROCEDURE — 83605 ASSAY OF LACTIC ACID: CPT

## 2024-01-01 PROCEDURE — 71045 X-RAY EXAM CHEST 1 VIEW: CPT

## 2024-01-01 PROCEDURE — P9016 RBC LEUKOCYTES REDUCED: HCPCS

## 2024-01-01 PROCEDURE — 93005 ELECTROCARDIOGRAM TRACING: CPT | Performed by: EMERGENCY MEDICINE

## 2024-01-01 PROCEDURE — 2580000003 HC RX 258: Performed by: EMERGENCY MEDICINE

## 2024-01-01 PROCEDURE — 87077 CULTURE AEROBIC IDENTIFY: CPT

## 2024-01-01 PROCEDURE — 94761 N-INVAS EAR/PLS OXIMETRY MLT: CPT

## 2024-01-01 PROCEDURE — 87150 DNA/RNA AMPLIFIED PROBE: CPT

## 2024-01-01 PROCEDURE — 2700000000 HC OXYGEN THERAPY PER DAY

## 2024-01-01 PROCEDURE — 36556 INSERT NON-TUNNEL CV CATH: CPT

## 2024-01-01 PROCEDURE — 87040 BLOOD CULTURE FOR BACTERIA: CPT

## 2024-01-01 PROCEDURE — 86901 BLOOD TYPING SEROLOGIC RH(D): CPT

## 2024-01-01 PROCEDURE — 36430 TRANSFUSION BLD/BLD COMPNT: CPT

## 2024-01-01 PROCEDURE — 2500000003 HC RX 250 WO HCPCS: Performed by: EMERGENCY MEDICINE

## 2024-01-01 PROCEDURE — 99285 EMERGENCY DEPT VISIT HI MDM: CPT

## 2024-01-01 PROCEDURE — 81001 URINALYSIS AUTO W/SCOPE: CPT

## 2024-01-01 PROCEDURE — 85025 COMPLETE CBC W/AUTO DIFF WBC: CPT

## 2024-01-01 PROCEDURE — 87186 SC STD MICRODIL/AGAR DIL: CPT

## 2024-01-01 PROCEDURE — 86850 RBC ANTIBODY SCREEN: CPT

## 2024-01-01 PROCEDURE — 6360000002 HC RX W HCPCS: Performed by: EMERGENCY MEDICINE

## 2024-01-01 PROCEDURE — 82803 BLOOD GASES ANY COMBINATION: CPT

## 2024-01-01 PROCEDURE — 80053 COMPREHEN METABOLIC PANEL: CPT

## 2024-01-01 PROCEDURE — 84484 ASSAY OF TROPONIN QUANT: CPT

## 2024-01-01 PROCEDURE — 86923 COMPATIBILITY TEST ELECTRIC: CPT

## 2024-01-01 PROCEDURE — 36415 COLL VENOUS BLD VENIPUNCTURE: CPT

## 2024-01-01 RX ORDER — 0.9 % SODIUM CHLORIDE 0.9 %
1000 INTRAVENOUS SOLUTION INTRAVENOUS ONCE
Status: COMPLETED | OUTPATIENT
Start: 2024-01-01 | End: 2024-01-01

## 2024-01-01 RX ORDER — NOREPINEPHRINE BITARTRATE 0.06 MG/ML
INJECTION, SOLUTION INTRAVENOUS
Status: COMPLETED
Start: 2024-01-01 | End: 2024-01-01

## 2024-01-01 RX ORDER — NOREPINEPHRINE BITARTRATE 0.06 MG/ML
1-100 INJECTION, SOLUTION INTRAVENOUS CONTINUOUS
Status: DISCONTINUED | OUTPATIENT
Start: 2024-01-01 | End: 2024-01-01 | Stop reason: HOSPADM

## 2024-01-01 RX ORDER — METRONIDAZOLE 500 MG/100ML
500 INJECTION, SOLUTION INTRAVENOUS ONCE
Status: DISCONTINUED | OUTPATIENT
Start: 2024-01-01 | End: 2024-01-01 | Stop reason: HOSPADM

## 2024-01-01 RX ORDER — CALCIUM CHLORIDE 100 MG/ML
INJECTION INTRAVENOUS; INTRAVENTRICULAR DAILY PRN
Status: COMPLETED | OUTPATIENT
Start: 2024-01-01 | End: 2024-01-01

## 2024-01-01 RX ORDER — AMIODARONE HYDROCHLORIDE 50 MG/ML
INJECTION, SOLUTION INTRAVENOUS DAILY PRN
Status: COMPLETED | OUTPATIENT
Start: 2024-01-01 | End: 2024-01-01

## 2024-01-01 RX ORDER — EPINEPHRINE IN SOD CHLOR,ISO 1 MG/10 ML
SYRINGE (ML) INTRAVENOUS DAILY PRN
Status: COMPLETED | OUTPATIENT
Start: 2024-01-01 | End: 2024-01-01

## 2024-01-01 RX ADMIN — EPINEPHRINE 1 MG: 0.1 INJECTION INTRACARDIAC; INTRAVENOUS at 15:30

## 2024-01-01 RX ADMIN — EPINEPHRINE 1 MG: 0.1 INJECTION INTRACARDIAC; INTRAVENOUS at 14:38

## 2024-01-01 RX ADMIN — EPINEPHRINE 15 MCG/MIN: 1 INJECTION PARENTERAL at 15:17

## 2024-01-01 RX ADMIN — SODIUM BICARBONATE 50 MEQ: 84 INJECTION INTRAVENOUS at 14:31

## 2024-01-01 RX ADMIN — NOREPINEPHRINE BITARTRATE 5 MCG/MIN: 0.06 INJECTION, SOLUTION INTRAVENOUS at 14:44

## 2024-01-01 RX ADMIN — SODIUM CHLORIDE 1000 ML: 9 INJECTION, SOLUTION INTRAVENOUS at 14:56

## 2024-01-01 RX ADMIN — CALCIUM CHLORIDE 1000 MG: 100 INJECTION, SOLUTION INTRAVENOUS; INTRAVENTRICULAR at 14:32

## 2024-01-01 RX ADMIN — AMIODARONE HYDROCHLORIDE 300 MG: 50 INJECTION, SOLUTION INTRAVENOUS at 14:32

## 2024-01-01 RX ADMIN — EPINEPHRINE 1 MG: 0.1 INJECTION INTRACARDIAC; INTRAVENOUS at 15:24

## 2024-01-01 RX ADMIN — EPINEPHRINE 1 MG: 0.1 INJECTION INTRACARDIAC; INTRAVENOUS at 14:35

## 2024-01-01 RX ADMIN — EPINEPHRINE 1 MG: 0.1 INJECTION INTRACARDIAC; INTRAVENOUS at 14:31

## 2024-01-01 RX ADMIN — EPINEPHRINE 1 MG: 0.1 INJECTION INTRACARDIAC; INTRAVENOUS at 15:35

## 2024-01-01 RX ADMIN — SODIUM BICARBONATE 50 MEQ: 84 INJECTION, SOLUTION INTRAVENOUS at 15:22

## 2024-01-01 RX ADMIN — EPINEPHRINE 1 MG: 0.1 INJECTION INTRACARDIAC; INTRAVENOUS at 15:33

## 2024-01-01 RX ADMIN — Medication 5 MCG/MIN: at 14:44

## 2024-01-01 SDOH — ECONOMIC STABILITY - HOUSING INSECURITY: HOMELESSNESS UNSPECIFIED: Z59.00

## 2024-01-01 ASSESSMENT — PULMONARY FUNCTION TESTS
PIF_VALUE: 19
PIF_VALUE: 22
PIF_VALUE: 24
PIF_VALUE: 24
PIF_VALUE: 23
PIF_VALUE: 20

## 2024-03-06 NOTE — CODE DOCUMENTATION
Uncross blood product that was sent up from blood bank (O-) Unit # [id number] blood was initiated by NADIA Shah per Dr. Leigh orders; started in the CVC.

## 2024-03-06 NOTE — ED NOTES
O- Blood product stopped at TOD per Dr. Brian orders. Patient received a total of 295 mL of the blood prior to d/c product

## 2024-03-06 NOTE — ED NOTES
Epi given by this RN per EDMD per thready pulse felt during central line placement by EDMD ; patient still in NSR;

## 2024-03-06 NOTE — ED TRIAGE NOTES
Patient arrived to the ED via EMS from home w/ wife unresponsive w/ compression actively going on.     EMS reports states that EMS that patient was down 10 minute prior to arrival of EMS and the wife reports that prior to him going down he was complaining of trouble getting his breath.     EMS reports that when they got to the patient he was asystole and they started CPR and flipped to PEA.   They gave a total of 4 rounds of epi and a total of 400mg of amiodarone prior to arrival to ED. Patient remained in PEA en route to ED.    When patient arrived to the ED w/ EMS CPR was still in place and patient was in PEA upon arrival to ED and Code Narrator was started and ED staff took over CPR.

## 2024-03-06 NOTE — CODE DOCUMENTATION
Dr. Leigh attempting ETT placement     7.5   23 lip  + color change  Bilateral breath sounds heard by Dr. Leigh

## 2024-03-06 NOTE — PROGRESS NOTES
Provided support to pt's spouse. Pt's spouse stated that she and the pt are homeless. Pt's spouse stated that she wanted to return to the location they were presently living and declined a request from this  to find a shelter for the night. Pt's spouse left in a Lift outside the ED.     03/06/24 1725   Encounter Summary   Encounter Overview/Reason  Grief, Loss, and Adjustments   Service Provided For: Family   Referral/Consult From: Nurse   Support System Spouse   Last Encounter  03/06/24  (support to pt's spouse CL)   Complexity of Encounter High   Begin Time 1520   End Time  1705   Total Time Calculated 105 min   Grief, Loss, and Adjustments   Type Death;Grief and loss   Assessment/Intervention/Outcome   Assessment Anxious;Sad   Intervention Active listening;Discussed illness injury and it’s impact;Explored/Affirmed feelings, thoughts, concerns;Grief Care;Sustaining Presence/Ministry of presence   Outcome Engaged in conversation;Coping;Grieving

## 2024-03-06 NOTE — CODE DOCUMENTATION
Rn started compression w/ PEA as rhythm on montior and poor perfusion seen on monitor via blood pressure ; code blue pressed and lucus was grabbed

## 2024-03-06 NOTE — ED PROVIDER NOTES
[facility] EMERGENCY DEPARTMENT  EMERGENCY DEPARTMENT ENCOUNTER        Pt Name: Logan Schwartz  MRN: [id number]  Birthdate 1945  Date of evaluation: 3/6/2024  Provider: Serafin Leigh MD  PCP: No primary care provider on file.  Note Started: 4:07 PM EST 3/6/24    CHIEF COMPLAINT       Cardiac arrest    HISTORY OF PRESENT ILLNESS: 1 or more Elements     History from : EMS and spouse    Limitations to history : Altered Mental Status    Logan Schwartz is a 78 y.o. male who presents in cardiac arrest.  PEA currently.  Patient is a homeless male.  His spouse states that he has not been feeling well for the last 2 to 3 days.  She denies any drinks alcohol states he does smoke cigarettes. No known history that she is aware of.  Patient denies any recent vomiting.  Just states that he has been feeling off for the last few days.  Went down.  She called 911.  Suppose a downtime of 10 minutes per EMS.  When they got there he was in asystole.  Converted to V-fib after epinephrine and got 3 shocks and is now in PEA.  Patient was not intubated in the field blood sugar was within normal limits.  They never got a pulse in the field.    Nursing Notes were all reviewed and agreed with or any disagreements were addressed in the HPI.    REVIEW OF SYSTEMS :      Review of Systems   Unable to perform ROS: Mental status change       Positives and Pertinent negatives as per HPI.     SURGICAL HISTORY   No past surgical history on file.    CURRENTMEDICATIONS       Previous Medications    ACETAMINOPHEN (AMINOFEN) 325 MG TABLET    Take 2 tablets by mouth every 6 hours as needed for Pain    ALBUTEROL SULFATE HFA (PROAIR HFA) 108 (90 BASE) MCG/ACT INHALER    Inhale 1-2 puffs by mouth every 4 hours while awake for 7-10 days then PRN wheezing  Dispense with SPACER and Instruct on use.  May sub Ventolin or Proventil as needed per Insurance.    DOCUSATE SODIUM (COLACE, DULCOLAX) 100 MG CAPS    Take 100 mg by mouth 2 times  patient to be included in the SEP-1 Core Measure due to severe sepsis or septic shock?   Yes   SEP-1 CORE MEASURE DATA      Sepsis Criteria   Severe Sepsis Criteria   Septic Shock Criteria     Must be confirmed or suspected to move forward with diagnosis of sepsis.    Must meet 2:    [] Temperature > 100.9 F (38.3 C)        or < 96.8 F (36 C)  [] HR > 90  [] RR > 20  [] WBC > 12 or < 4 or 10% bands      AND:      [] Infection Confirmed or        Suspected.     Must meet 1:    [] Lactate > 2       or   [] Signs of Organ Dysfunction:    - SBP < 90 or MAP < 65  - Altered mental status  - Creatinine > 2 or increased from      baseline  - Urine Output < 0.5 ml/kg/hr  - Bilirubin > 2  - INR > 1.5 (not anticoagulated)  - Platelets < 100,000  - Acute Respiratory Failure as     evidenced by new need for NIPPV     or mechanical ventilation      [] No criteria met for Severe Sepsis.   Must meet 1:    [] Lactate > 4        or   [] SBP < 90 or MAP < 65 for at        least two readings in the first        hour after fluid bolus        administration      [] Vasopressors initiated (if hypotension persists after fluid resuscitation)        [] No criteria met for Septic Shock.   Patient Vitals for the past 6 hrs:   BP Temp Pulse Resp SpO2   03/06/24 1432 -- -- (!) 0 -- --   03/06/24 1439 -- (!) 91.1 °F (32.8 °C) 62 -- --   03/06/24 1441 (!) 123/50 -- 66 (!) 7 --   03/06/24 1442 (!) 134/19 -- 65 20 --   03/06/24 1446 (!) 130/26 -- 64 20 --   03/06/24 1447 -- -- 66 20 92 %   03/06/24 1451 (!) 95/41 -- 56 20 --   03/06/24 1455 (!) 76/61 -- 53 15 --   03/06/24 1500 (!) 47/30 -- (!) 47 20 --   03/06/24 1505 (!) 59/19 -- (!) 49 20 --   03/06/24 1509 (!) 77/33 -- 58 20 92 %   03/06/24 1510 (!) 75/26 -- 56 20 91 %   03/06/24 1512 (!) 70/37 -- 82 20 91 %   03/06/24 1515 (!) 64/35 -- 51 20 92 %   03/06/24 1520 (!) 54/30 -- (!) 49 20 92 %   03/06/24 1525 (!) 32/22 -- (!) 47 (!) 38 93 %   03/06/24 1530 (!) 99/39 -- (!) 111 (!) 36 (!) 40 %

## 2024-03-07 LAB
EKG ATRIAL RATE: 66 BPM
EKG DIAGNOSIS: NORMAL
EKG P AXIS: 9 DEGREES
EKG P-R INTERVAL: 206 MS
EKG Q-T INTERVAL: 442 MS
EKG QRS DURATION: 162 MS
EKG QTC CALCULATION (BAZETT): 463 MS
EKG R AXIS: -84 DEGREES
EKG T AXIS: 85 DEGREES
EKG VENTRICULAR RATE: 66 BPM
EMERGENCY ISSUE BLOOD PRODUCTS SIGNED FORM: NORMAL
REPORT: NORMAL

## 2024-03-10 LAB
BACTERIA BLD CULT: ABNORMAL
BACTERIA BLD CULT: ABNORMAL
ORGANISM: ABNORMAL
ORGANISM: ABNORMAL